# Patient Record
Sex: MALE | Race: OTHER | NOT HISPANIC OR LATINO
[De-identification: names, ages, dates, MRNs, and addresses within clinical notes are randomized per-mention and may not be internally consistent; named-entity substitution may affect disease eponyms.]

---

## 2021-09-30 RX ORDER — CHLORHEXIDINE GLUCONATE 213 G/1000ML
1 SOLUTION TOPICAL ONCE
Refills: 0 | Status: DISCONTINUED | OUTPATIENT
Start: 2021-10-04 | End: 2021-10-05

## 2021-09-30 RX ORDER — DAPAGLIFLOZIN 10 MG/1
1 TABLET, FILM COATED ORAL
Qty: 0 | Refills: 0 | DISCHARGE

## 2021-09-30 NOTE — H&P ADULT - HISTORY OF PRESENT ILLNESS
***SKELETON***    CARDIOLOGIST: Dr. Tejeda  COVID:  PHARMACY:  ESCORT:     GINA tis 65yo Male, active smoker (1PPD) w/ PMHx of HTN, HLD, DM T2, CAD (known mLAD occlusion w/ Hx of failed PCI attempt) who presented to cardiologist, Dr. Tejeda, for evaluation of chest pain that has been occurring for several years and described as intermittent, moderate, substernal, and "pressure-like". Episodes occur mostly with exertion and relieved w/ rest in a few minutes. CP non-radiating and is associated w/ SOB. Pt denies diaphoresis, palpitations, dizziness/syncope, LE edema, orthopnea, PND.     TTE (8/5/21): LVEF 55-60%, grade II D D (pseudonormal), no significant valvular disease. EST (9/10/21): severe, large sized reversible defect in true apex; as well as anteroseptal and anterior walls from apex to mid ventricle, consistent with inducible ischemia in LAD territory.     In light of patient's risk factors, CCS Class III Anginal symptoms, and abnormal EST, patient now presents to [facility] for cardiac catheterization with possible intervention if clinically indicated.  CARDIOLOGIST: Dr. Tejeda  COVID: pt given hotline on 9/30/21  PHARMACY: [facility] (in [place])  ESCORT:     P tis 65yo Cantonese-speaking Male, active smoker (1PPD) w/ PMHx of HTN, HLD, DM T2, CAD (known mLAD occlusion w/ Hx of failed PCI attempt) who presented to cardiologist, Dr. Tejeda, for evaluation of chest pain that has been occurring for several years and described as intermittent, moderate, substernal, and "pressure-like". Episodes occur mostly with exertion and relieved w/ rest in a few minutes. CP non-radiating and is associated w/ SOB. Pt denies diaphoresis, palpitations, dizziness/syncope, LE edema, orthopnea, PND.     TTE (8/5/21): LVEF 55-60%, grade II D D (pseudonormal), no significant valvular disease. EST (9/10/21): severe, large sized reversible defect in true apex; as well as anteroseptal and anterior walls from apex to mid ventricle, consistent with inducible ischemia in LAD territory.     In light of patient's risk factors, CCS Class III Anginal symptoms, and abnormal EST, patient now presents to [facility] for cardiac catheterization with possible intervention if clinically indicated.  CARDIOLOGIST: Dr. Tejeda  COVID: pt given hotline on 9/30/21  PHARMACY: [facility] (in [place])  ESCORT:     Pt is 65yo Cantonese-speaking Male, active smoker (1PPD) w/ PMHx of HTN, HLD, DM T2, CAD (known mLAD occlusion w/ Hx of failed PCI attempt) who presented to cardiologist, Dr. Tejeda, for evaluation of chest pain that has been occurring for several years and described as intermittent, moderate, substernal, and "pressure-like". Episodes occur mostly with exertion and relieved w/ rest in a few minutes. CP non-radiating and is associated w/ SOB. Pt denies diaphoresis, palpitations, dizziness/syncope, LE edema, orthopnea, PND.     TTE (8/5/21): LVEF 55-60%, grade II D D (pseudonormal), no significant valvular disease. EST (9/10/21): severe, large sized reversible defect in true apex; as well as anteroseptal and anterior walls from apex to mid ventricle, consistent with inducible ischemia in LAD territory.     In light of patient's risk factors, CCS Class III Anginal symptoms, and abnormal EST, patient now presents to [facility] for cardiac catheterization with possible intervention if clinically indicated.

## 2021-09-30 NOTE — H&P ADULT - NSICDXPASTMEDICALHX_GEN_ALL_CORE_FT
PAST MEDICAL HISTORY:  CAD (coronary artery disease)     DM (diabetes mellitus)     HLD (hyperlipidemia)     HTN (hypertension)

## 2021-09-30 NOTE — H&P ADULT - ASSESSMENT
Risks & benefits of procedure and alternative therapy have been explained to the patient including but not limited to: allergic reaction, bleeding w/possible need for blood transfusion, infection, renal and vascular compromise, limb damage, arrhythmia, stroke, vessel dissection/perforation, Myocardial infarction, emergent CABG. Informed consent obtained and in chart.  Pt is 67yo Cantonese-speaking Male, active smoker (1PPD) w/ PMHx of HTN, HLD, DM T2, CAD (known mLAD occlusion w/ Hx of failed PCI attempt) who presented to cardiologist, Dr. Tejeda, for evaluation of chest pain that has been occurring for several years and described as intermittent, moderate, substernal, and "pressure-like". Episodes occur mostly with exertion and relieved w/ rest in a few minutes. CP non-radiating and is associated w/ SOB. Pt denies diaphoresis, palpitations, dizziness/syncope, LE edema, orthopnea, PND. TTE (8/5/21): LVEF 55-60%, grade II D D (pseudonormal), no significant valvular disease. EST (9/10/21): severe, large sized reversible defect in true apex; as well as anteroseptal and anterior walls from apex to mid ventricle, consistent with inducible ischemia in LAD territory. In light of patient's risk factors, CCS Class III Anginal symptoms, and abnormal EST, patient now presents to [facility] for cardiac catheterization with possible intervention if clinically indicated.     --ASA III; Mallampati III.   --VSS.   --Pt is a candidate for moderate sedation.   --LOAD: ASA 81mg PO x1 and Plavix 600mg PO x1.  --FLUIDS: NS 75cc/hr x4hrs.   --SEAFOOD ALLERGY: pt given Solucortef 200mg IV x1 and Benadryl 50mg IV x1 order to be given in procedure room.       Risks & benefits of procedure and alternative therapy have been explained to the patient including but not limited to: allergic reaction, bleeding w/possible need for blood transfusion, infection, renal and vascular compromise, limb damage, arrhythmia, stroke, vessel dissection/perforation, Myocardial infarction, emergent CABG. Informed consent obtained and in chart.

## 2021-10-01 ENCOUNTER — APPOINTMENT (OUTPATIENT)
Dept: DISASTER EMERGENCY | Facility: CLINIC | Age: 66
End: 2021-10-01

## 2021-10-01 DIAGNOSIS — Z01.818 ENCOUNTER FOR OTHER PREPROCEDURAL EXAMINATION: ICD-10-CM

## 2021-10-01 PROBLEM — Z00.00 ENCOUNTER FOR PREVENTIVE HEALTH EXAMINATION: Status: ACTIVE | Noted: 2021-10-01

## 2021-10-04 ENCOUNTER — INPATIENT (INPATIENT)
Facility: HOSPITAL | Age: 66
LOS: 0 days | Discharge: HOME | DRG: 287 | End: 2021-10-05
Attending: INTERNAL MEDICINE | Admitting: INTERNAL MEDICINE
Payer: MEDICARE

## 2021-10-04 VITALS
HEIGHT: 64.17 IN | RESPIRATION RATE: 16 BRPM | OXYGEN SATURATION: 98 % | HEART RATE: 70 BPM | DIASTOLIC BLOOD PRESSURE: 80 MMHG | WEIGHT: 216.05 LBS | SYSTOLIC BLOOD PRESSURE: 167 MMHG

## 2021-10-04 LAB
A1C WITH ESTIMATED AVERAGE GLUCOSE RESULT: 6.2 % — HIGH (ref 4–5.6)
ALBUMIN SERPL ELPH-MCNC: 4.8 G/DL — SIGNIFICANT CHANGE UP (ref 3.3–5)
ALP SERPL-CCNC: 84 U/L — SIGNIFICANT CHANGE UP (ref 40–120)
ALT FLD-CCNC: 35 U/L — SIGNIFICANT CHANGE UP (ref 10–45)
ANION GAP SERPL CALC-SCNC: 11 MMOL/L — SIGNIFICANT CHANGE UP (ref 5–17)
APTT BLD: 36.1 SEC — HIGH (ref 27.5–35.5)
AST SERPL-CCNC: 24 U/L — SIGNIFICANT CHANGE UP (ref 10–40)
BASOPHILS # BLD AUTO: 0.07 K/UL — SIGNIFICANT CHANGE UP (ref 0–0.2)
BASOPHILS NFR BLD AUTO: 0.8 % — SIGNIFICANT CHANGE UP (ref 0–2)
BILIRUB SERPL-MCNC: 0.9 MG/DL — SIGNIFICANT CHANGE UP (ref 0.2–1.2)
BUN SERPL-MCNC: 18 MG/DL — SIGNIFICANT CHANGE UP (ref 7–23)
CALCIUM SERPL-MCNC: 10 MG/DL — SIGNIFICANT CHANGE UP (ref 8.4–10.5)
CHLORIDE SERPL-SCNC: 102 MMOL/L — SIGNIFICANT CHANGE UP (ref 96–108)
CHOLEST SERPL-MCNC: 113 MG/DL — SIGNIFICANT CHANGE UP
CK MB CFR SERPL CALC: 1.4 NG/ML — SIGNIFICANT CHANGE UP (ref 0–6.7)
CK SERPL-CCNC: 53 U/L — SIGNIFICANT CHANGE UP (ref 30–200)
CO2 SERPL-SCNC: 29 MMOL/L — SIGNIFICANT CHANGE UP (ref 22–31)
CREAT SERPL-MCNC: 0.59 MG/DL — SIGNIFICANT CHANGE UP (ref 0.5–1.3)
EOSINOPHIL # BLD AUTO: 0.21 K/UL — SIGNIFICANT CHANGE UP (ref 0–0.5)
EOSINOPHIL NFR BLD AUTO: 2.5 % — SIGNIFICANT CHANGE UP (ref 0–6)
ESTIMATED AVERAGE GLUCOSE: 131 MG/DL — HIGH (ref 68–114)
GLUCOSE BLDC GLUCOMTR-MCNC: 114 MG/DL — HIGH (ref 70–99)
GLUCOSE BLDC GLUCOMTR-MCNC: 140 MG/DL — HIGH (ref 70–99)
GLUCOSE BLDC GLUCOMTR-MCNC: 191 MG/DL — HIGH (ref 70–99)
GLUCOSE SERPL-MCNC: 122 MG/DL — HIGH (ref 70–99)
HCT VFR BLD CALC: 46.1 % — SIGNIFICANT CHANGE UP (ref 39–50)
HDLC SERPL-MCNC: 37 MG/DL — LOW
HGB BLD-MCNC: 15.5 G/DL — SIGNIFICANT CHANGE UP (ref 13–17)
IMM GRANULOCYTES NFR BLD AUTO: 0.6 % — SIGNIFICANT CHANGE UP (ref 0–1.5)
INR BLD: 1 — SIGNIFICANT CHANGE UP (ref 0.88–1.16)
LIPID PNL WITH DIRECT LDL SERPL: 54 MG/DL — SIGNIFICANT CHANGE UP
LYMPHOCYTES # BLD AUTO: 3.08 K/UL — SIGNIFICANT CHANGE UP (ref 1–3.3)
LYMPHOCYTES # BLD AUTO: 35.9 % — SIGNIFICANT CHANGE UP (ref 13–44)
MCHC RBC-ENTMCNC: 30.8 PG — SIGNIFICANT CHANGE UP (ref 27–34)
MCHC RBC-ENTMCNC: 33.6 GM/DL — SIGNIFICANT CHANGE UP (ref 32–36)
MCV RBC AUTO: 91.5 FL — SIGNIFICANT CHANGE UP (ref 80–100)
MONOCYTES # BLD AUTO: 0.71 K/UL — SIGNIFICANT CHANGE UP (ref 0–0.9)
MONOCYTES NFR BLD AUTO: 8.3 % — SIGNIFICANT CHANGE UP (ref 2–14)
NEUTROPHILS # BLD AUTO: 4.45 K/UL — SIGNIFICANT CHANGE UP (ref 1.8–7.4)
NEUTROPHILS NFR BLD AUTO: 51.9 % — SIGNIFICANT CHANGE UP (ref 43–77)
NON HDL CHOLESTEROL: 76 MG/DL — SIGNIFICANT CHANGE UP
NRBC # BLD: 0 /100 WBCS — SIGNIFICANT CHANGE UP (ref 0–0)
PLATELET # BLD AUTO: 144 K/UL — LOW (ref 150–400)
POTASSIUM SERPL-MCNC: 4 MMOL/L — SIGNIFICANT CHANGE UP (ref 3.5–5.3)
POTASSIUM SERPL-SCNC: 4 MMOL/L — SIGNIFICANT CHANGE UP (ref 3.5–5.3)
PROT SERPL-MCNC: 8.8 G/DL — HIGH (ref 6–8.3)
PROTHROM AB SERPL-ACNC: 12 SEC — SIGNIFICANT CHANGE UP (ref 10.6–13.6)
RBC # BLD: 5.04 M/UL — SIGNIFICANT CHANGE UP (ref 4.2–5.8)
RBC # FLD: 13.9 % — SIGNIFICANT CHANGE UP (ref 10.3–14.5)
SODIUM SERPL-SCNC: 142 MMOL/L — SIGNIFICANT CHANGE UP (ref 135–145)
TRIGL SERPL-MCNC: 112 MG/DL — SIGNIFICANT CHANGE UP
WBC # BLD: 8.57 K/UL — SIGNIFICANT CHANGE UP (ref 3.8–10.5)
WBC # FLD AUTO: 8.57 K/UL — SIGNIFICANT CHANGE UP (ref 3.8–10.5)

## 2021-10-04 PROCEDURE — 99152 MOD SED SAME PHYS/QHP 5/>YRS: CPT | Mod: 59

## 2021-10-04 PROCEDURE — 93010 ELECTROCARDIOGRAM REPORT: CPT

## 2021-10-04 PROCEDURE — 93454 CORONARY ARTERY ANGIO S&I: CPT | Mod: 26

## 2021-10-04 RX ORDER — ATORVASTATIN CALCIUM 80 MG/1
1 TABLET, FILM COATED ORAL
Qty: 0 | Refills: 0 | DISCHARGE

## 2021-10-04 RX ORDER — INSULIN LISPRO 100/ML
VIAL (ML) SUBCUTANEOUS
Refills: 0 | Status: DISCONTINUED | OUTPATIENT
Start: 2021-10-04 | End: 2021-10-05

## 2021-10-04 RX ORDER — NITROGLYCERIN 6.5 MG
1 CAPSULE, EXTENDED RELEASE ORAL
Qty: 0 | Refills: 0 | DISCHARGE

## 2021-10-04 RX ORDER — DEXTROSE 50 % IN WATER 50 %
25 SYRINGE (ML) INTRAVENOUS ONCE
Refills: 0 | Status: DISCONTINUED | OUTPATIENT
Start: 2021-10-04 | End: 2021-10-05

## 2021-10-04 RX ORDER — METOPROLOL TARTRATE 50 MG
50 TABLET ORAL DAILY
Refills: 0 | Status: DISCONTINUED | OUTPATIENT
Start: 2021-10-04 | End: 2021-10-05

## 2021-10-04 RX ORDER — HYDROCORTISONE 20 MG
200 TABLET ORAL ONCE
Refills: 0 | Status: COMPLETED | OUTPATIENT
Start: 2021-10-04 | End: 2021-10-04

## 2021-10-04 RX ORDER — CLOPIDOGREL BISULFATE 75 MG/1
600 TABLET, FILM COATED ORAL ONCE
Refills: 0 | Status: COMPLETED | OUTPATIENT
Start: 2021-10-04 | End: 2021-10-04

## 2021-10-04 RX ORDER — ATORVASTATIN CALCIUM 80 MG/1
40 TABLET, FILM COATED ORAL AT BEDTIME
Refills: 0 | Status: DISCONTINUED | OUTPATIENT
Start: 2021-10-04 | End: 2021-10-05

## 2021-10-04 RX ORDER — SODIUM CHLORIDE 9 MG/ML
500 INJECTION INTRAMUSCULAR; INTRAVENOUS; SUBCUTANEOUS
Refills: 0 | Status: DISCONTINUED | OUTPATIENT
Start: 2021-10-04 | End: 2021-10-05

## 2021-10-04 RX ORDER — DEXTROSE 50 % IN WATER 50 %
15 SYRINGE (ML) INTRAVENOUS ONCE
Refills: 0 | Status: DISCONTINUED | OUTPATIENT
Start: 2021-10-04 | End: 2021-10-05

## 2021-10-04 RX ORDER — METFORMIN HYDROCHLORIDE 850 MG/1
1 TABLET ORAL
Qty: 0 | Refills: 0 | DISCHARGE

## 2021-10-04 RX ORDER — ASPIRIN/CALCIUM CARB/MAGNESIUM 324 MG
81 TABLET ORAL ONCE
Refills: 0 | Status: COMPLETED | OUTPATIENT
Start: 2021-10-04 | End: 2021-10-04

## 2021-10-04 RX ORDER — SODIUM CHLORIDE 9 MG/ML
1000 INJECTION, SOLUTION INTRAVENOUS
Refills: 0 | Status: DISCONTINUED | OUTPATIENT
Start: 2021-10-04 | End: 2021-10-05

## 2021-10-04 RX ORDER — GLUCAGON INJECTION, SOLUTION 0.5 MG/.1ML
1 INJECTION, SOLUTION SUBCUTANEOUS ONCE
Refills: 0 | Status: DISCONTINUED | OUTPATIENT
Start: 2021-10-04 | End: 2021-10-05

## 2021-10-04 RX ORDER — SODIUM CHLORIDE 9 MG/ML
500 INJECTION INTRAMUSCULAR; INTRAVENOUS; SUBCUTANEOUS
Refills: 0 | Status: DISCONTINUED | OUTPATIENT
Start: 2021-10-04 | End: 2021-10-04

## 2021-10-04 RX ORDER — AMLODIPINE BESYLATE 2.5 MG/1
1 TABLET ORAL
Qty: 0 | Refills: 0 | DISCHARGE

## 2021-10-04 RX ORDER — ASPIRIN/CALCIUM CARB/MAGNESIUM 324 MG
81 TABLET ORAL DAILY
Refills: 0 | Status: DISCONTINUED | OUTPATIENT
Start: 2021-10-05 | End: 2021-10-05

## 2021-10-04 RX ORDER — ISOSORBIDE MONONITRATE 60 MG/1
30 TABLET, EXTENDED RELEASE ORAL DAILY
Refills: 0 | Status: DISCONTINUED | OUTPATIENT
Start: 2021-10-04 | End: 2021-10-05

## 2021-10-04 RX ORDER — DEXTROSE 50 % IN WATER 50 %
12.5 SYRINGE (ML) INTRAVENOUS ONCE
Refills: 0 | Status: DISCONTINUED | OUTPATIENT
Start: 2021-10-04 | End: 2021-10-05

## 2021-10-04 RX ORDER — ERGOCALCIFEROL 1.25 MG/1
1 CAPSULE ORAL
Qty: 0 | Refills: 0 | DISCHARGE

## 2021-10-04 RX ORDER — DAPAGLIFLOZIN 10 MG/1
1 TABLET, FILM COATED ORAL
Qty: 0 | Refills: 0 | DISCHARGE

## 2021-10-04 RX ORDER — DIPHENHYDRAMINE HCL 50 MG
50 CAPSULE ORAL ONCE
Refills: 0 | Status: DISCONTINUED | OUTPATIENT
Start: 2021-10-04 | End: 2021-10-05

## 2021-10-04 RX ADMIN — Medication 2: at 21:52

## 2021-10-04 RX ADMIN — CLOPIDOGREL BISULFATE 600 MILLIGRAM(S): 75 TABLET, FILM COATED ORAL at 12:43

## 2021-10-04 RX ADMIN — SODIUM CHLORIDE 75 MILLILITER(S): 9 INJECTION INTRAMUSCULAR; INTRAVENOUS; SUBCUTANEOUS at 12:44

## 2021-10-04 RX ADMIN — ATORVASTATIN CALCIUM 40 MILLIGRAM(S): 80 TABLET, FILM COATED ORAL at 21:22

## 2021-10-04 RX ADMIN — Medication 200 MILLIGRAM(S): at 12:45

## 2021-10-04 RX ADMIN — Medication 81 MILLIGRAM(S): at 12:43

## 2021-10-04 NOTE — PATIENT PROFILE ADULT - NSTOBACCO QUIT READY_GEN_A_CORE_SD
patient is in process of quitting, slowly by decreasing the amount of cigarettes per day./ready to quit

## 2021-10-04 NOTE — PROGRESS NOTE ADULT - SUBJECTIVE AND OBJECTIVE BOX
Interventional Cardiology PA SDA Discharge Note    Patient without complaints.     Afebrile, VSS    Ext:    		Right/Left             Groin:   no    hematoma,    no bruit, dressing; C/D/I  		        Right   Radial :  no  hematoma,  no   bleeding, dressing; C/D/I      Pulses:    intact RAD/DP/PT to baseline     A/P:        67yo Cantonese-speaking Male, active smoker (1PPD) w/ PMHx of HTN, HLD, DM T2, CAD (known mLAD occlusion w/ Hx of failed PCI attempt) who presented to cardiologist, Dr. Tejeda, for evaluation of chest pain that has been occurring for several years and described as intermittent, moderate, substernal, and "pressure-like". Episodes occur mostly with exertion and relieved w/ rest in a few minutes. CP non-radiating and is associated w/ SOB. Pt denies diaphoresis, palpitations, dizziness/syncope, LE edema, orthopnea, PND.  TTE (8/5/21): LVEF 55-60%, grade II D D (pseudonormal), no significant valvular disease. EST (9/10/21): severe, large sized reversible defect in true apex; as well as anteroseptal and anterior walls from apex to mid ventricle, consistent with inducible ischemia in LAD territory.     In light of patient's risk factors, CCS Class III Anginal symptoms, and abnormal EST, patient now presents to [facility] for cardiac catheterization with possible intervention if clinically indicated.     Pt. s/p cardiac cath 10/4/21: unsuccessful  attempt of 100 % total occlusion of pRCA with plan to reattempt in 6 week; R rad TR band, R groin PC and L groin AS.          1.	Stable for discharge as per attending  __Nikhil_______ after bed rest, pt voids, groin/wrist stable and 30 minutes of ambulation.  2.	Follow-up with PMD/Cardiologist _Dr. Tejeda_________ in 1-2 weeks  3.	Discharged forms signed and copies in chart    Interventional Cardiology PA SDA Discharge Note    Patient without complaints.     Afebrile, VSS    Ext:    		Right/Left             Groin:   no    hematoma,    no bruit, dressing; C/D/I  		        Right   Radial :  no  hematoma,  no   bleeding, dressing; C/D/I      Pulses:    intact RAD/DP/PT to baseline     A/P:        65yo Cantonese-speaking Male, active smoker (1PPD) w/ PMHx of HTN, HLD, DM T2, CAD (known mLAD occlusion w/ Hx of failed PCI attempt) who presented to cardiologist, Dr. Tejeda, for evaluation of chest pain that has been occurring for several years and described as intermittent, moderate, substernal, and "pressure-like". Episodes occur mostly with exertion and relieved w/ rest in a few minutes. CP non-radiating and is associated w/ SOB. Pt denies diaphoresis, palpitations, dizziness/syncope, LE edema, orthopnea, PND.  TTE (8/5/21): LVEF 55-60%, grade II D D (pseudonormal), no significant valvular disease. EST (9/10/21): severe, large sized reversible defect in true apex; as well as anteroseptal and anterior walls from apex to mid ventricle, consistent with inducible ischemia in LAD territory.     In light of patient's risk factors, CCS Class III Anginal symptoms, and abnormal EST, patient now presents to [facility] for cardiac catheterization with possible intervention if clinically indicated.     Pt. s/p cardiac cath 10/4/21: unsuccessful  attempt of pLAD with plan to reattempt in 6 week; R rad TR band, R groin PC and L groin AS.          1.	Stable for discharge as per attending  __Nikhil_______ after bed rest, pt voids, groin/wrist stable and 30 minutes of ambulation.  2.	Follow-up with PMD/Cardiologist _Dr. Tejeda_________ in 1-2 weeks  3.	Discharged forms signed and copies in chart

## 2021-10-05 ENCOUNTER — TRANSCRIPTION ENCOUNTER (OUTPATIENT)
Age: 66
End: 2021-10-05

## 2021-10-05 VITALS
HEART RATE: 60 BPM | RESPIRATION RATE: 18 BRPM | DIASTOLIC BLOOD PRESSURE: 72 MMHG | SYSTOLIC BLOOD PRESSURE: 140 MMHG | OXYGEN SATURATION: 98 %

## 2021-10-05 LAB
ANION GAP SERPL CALC-SCNC: 10 MMOL/L — SIGNIFICANT CHANGE UP (ref 5–17)
BUN SERPL-MCNC: 23 MG/DL — SIGNIFICANT CHANGE UP (ref 7–23)
CALCIUM SERPL-MCNC: 9 MG/DL — SIGNIFICANT CHANGE UP (ref 8.4–10.5)
CHLORIDE SERPL-SCNC: 109 MMOL/L — HIGH (ref 96–108)
CO2 SERPL-SCNC: 25 MMOL/L — SIGNIFICANT CHANGE UP (ref 22–31)
COVID-19 SPIKE DOMAIN AB INTERP: POSITIVE
COVID-19 SPIKE DOMAIN ANTIBODY RESULT: >250 U/ML — HIGH
CREAT SERPL-MCNC: 0.61 MG/DL — SIGNIFICANT CHANGE UP (ref 0.5–1.3)
GLUCOSE BLDC GLUCOMTR-MCNC: 143 MG/DL — HIGH (ref 70–99)
GLUCOSE BLDC GLUCOMTR-MCNC: 163 MG/DL — HIGH (ref 70–99)
GLUCOSE SERPL-MCNC: 134 MG/DL — HIGH (ref 70–99)
HCT VFR BLD CALC: 41.3 % — SIGNIFICANT CHANGE UP (ref 39–50)
HCV AB S/CO SERPL IA: 0.15 S/CO — SIGNIFICANT CHANGE UP
HCV AB SERPL-IMP: SIGNIFICANT CHANGE UP
HGB BLD-MCNC: 13.7 G/DL — SIGNIFICANT CHANGE UP (ref 13–17)
MAGNESIUM SERPL-MCNC: 1.8 MG/DL — SIGNIFICANT CHANGE UP (ref 1.6–2.6)
MCHC RBC-ENTMCNC: 31.4 PG — SIGNIFICANT CHANGE UP (ref 27–34)
MCHC RBC-ENTMCNC: 33.2 GM/DL — SIGNIFICANT CHANGE UP (ref 32–36)
MCV RBC AUTO: 94.5 FL — SIGNIFICANT CHANGE UP (ref 80–100)
NRBC # BLD: 0 /100 WBCS — SIGNIFICANT CHANGE UP (ref 0–0)
PLATELET # BLD AUTO: 133 K/UL — LOW (ref 150–400)
POTASSIUM SERPL-MCNC: 3.8 MMOL/L — SIGNIFICANT CHANGE UP (ref 3.5–5.3)
POTASSIUM SERPL-SCNC: 3.8 MMOL/L — SIGNIFICANT CHANGE UP (ref 3.5–5.3)
RBC # BLD: 4.37 M/UL — SIGNIFICANT CHANGE UP (ref 4.2–5.8)
RBC # FLD: 14 % — SIGNIFICANT CHANGE UP (ref 10.3–14.5)
SARS-COV-2 IGG+IGM SERPL QL IA: >250 U/ML — HIGH
SARS-COV-2 IGG+IGM SERPL QL IA: POSITIVE
SODIUM SERPL-SCNC: 144 MMOL/L — SIGNIFICANT CHANGE UP (ref 135–145)
WBC # BLD: 7.85 K/UL — SIGNIFICANT CHANGE UP (ref 3.8–10.5)
WBC # FLD AUTO: 7.85 K/UL — SIGNIFICANT CHANGE UP (ref 3.8–10.5)

## 2021-10-05 RX ORDER — METFORMIN HYDROCHLORIDE 850 MG/1
1 TABLET ORAL
Qty: 0 | Refills: 0 | DISCHARGE

## 2021-10-05 RX ORDER — MAGNESIUM OXIDE 400 MG ORAL TABLET 241.3 MG
400 TABLET ORAL ONCE
Refills: 0 | Status: COMPLETED | OUTPATIENT
Start: 2021-10-05 | End: 2021-10-05

## 2021-10-05 RX ORDER — POTASSIUM CHLORIDE 20 MEQ
20 PACKET (EA) ORAL ONCE
Refills: 0 | Status: COMPLETED | OUTPATIENT
Start: 2021-10-05 | End: 2021-10-05

## 2021-10-05 RX ADMIN — Medication 2: at 12:03

## 2021-10-05 RX ADMIN — Medication 81 MILLIGRAM(S): at 12:02

## 2021-10-05 RX ADMIN — MAGNESIUM OXIDE 400 MG ORAL TABLET 400 MILLIGRAM(S): 241.3 TABLET ORAL at 07:38

## 2021-10-05 RX ADMIN — Medication 50 MILLIGRAM(S): at 05:27

## 2021-10-05 RX ADMIN — ISOSORBIDE MONONITRATE 30 MILLIGRAM(S): 60 TABLET, EXTENDED RELEASE ORAL at 12:02

## 2021-10-05 RX ADMIN — Medication 20 MILLIEQUIVALENT(S): at 07:38

## 2021-10-05 NOTE — DISCHARGE NOTE PROVIDER - HOSPITAL COURSE
65yo Cantonese-speaking Male, active smoker (1PPD) w/ PMHx of HTN, HLD, DM T2, CAD (known mLAD occlusion w/ Hx of failed PCI attempt) who presented to cardiologist, Dr. Tejeda, for evaluation of chest pain that has been occurring for several years and described as intermittent, moderate, substernal, and "pressure-like". Episodes occur mostly with exertion and relieved w/ rest in a few minutes. CP non-radiating and is associated w/ SOB. Pt denies diaphoresis, palpitations, dizziness/syncope, LE edema, orthopnea, PND.     TTE (8/5/21): LVEF 55-60%, grade II D D (pseudonormal), no significant valvular disease. EST (9/10/21): severe, large sized reversible defect in true apex; as well as anteroseptal and anterior walls from apex to mid ventricle, consistent with inducible ischemia in LAD territory.   In light of patient's risk factors, CCS Class III Anginal symptoms, and abnormal EST, patient now presents to [facility] for cardiac catheterization with possible intervention if clinically indicated.     Patient now s/p cardiac cath 10/4: unsuccessful  attempt of pLAD with plan to reattempt in 6 weeks; R rad TR band X 2, R groin PC and L groin AS. EF normal by ECHO. Post procedure R radial hematoma compressed; additional radial band placed    Pt seen and examined at bedside this morning. Pt comfortable, denies any CP, SOB, dizziness, or palpitations. VSS, right radial and BL groin access sites stable, no bleeding or hematoma noted, pulse 2 + b/l. AM labs stable. Patient has been given appropriate discharge instructions including medication regimen, access site management and follow up. Prescriptions have been e-prescribed to patient's preferred pharmacy. Follow up with Dr. Tejeda 10/7 and return in 6 weeks for re-attempt.     DC Meds: 65yo Cantonese-speaking Male, active smoker (1PPD) w/ PMHx of HTN, HLD, DM T2, CAD (known mLAD occlusion w/ Hx of failed PCI attempt) who presented to cardiologist, Dr. Tejeda, for evaluation of chest pain that has been occurring for several years and described as intermittent, moderate, substernal, and "pressure-like". Episodes occur mostly with exertion and relieved w/ rest in a few minutes. CP non-radiating and is associated w/ SOB. Pt denies diaphoresis, palpitations, dizziness/syncope, LE edema, orthopnea, PND.     TTE (8/5/21): LVEF 55-60%, grade II D D (pseudonormal), no significant valvular disease. EST (9/10/21): severe, large sized reversible defect in true apex; as well as anteroseptal and anterior walls from apex to mid ventricle, consistent with inducible ischemia in LAD territory.   In light of patient's risk factors, CCS Class III Anginal symptoms, and abnormal EST, patient now presents to [facility] for cardiac catheterization with possible intervention if clinically indicated.     Patient now s/p cardiac cath 10/4: unsuccessful  attempt of pLAD with plan to reattempt in 6 weeks; R rad TR band X 2, R groin PC and L groin AS. EF normal by ECHO. Post procedure R radial hematoma compressed; additional radial band placed    Pt seen and examined at bedside this morning. Pt comfortable, denies any CP, SOB, dizziness, or palpitations. VSS, right radial and BL groin access sites stable, no bleeding or hematoma noted, pulse 2 + b/l. AM labs stable. Patient has been given appropriate discharge instructions including medication regimen, access site management and follow up. Prescriptions have been e-prescribed to patient's preferred pharmacy. Follow up with Dr. Tejeda 10/7 and return in 6 weeks for re-attempt.     DC Meds:   ASA 81mg  Imdur 30mg  Losartan 100mg  Rosuvastatin 10mg  Toprol XL 50mg  Vascepa 2g BID

## 2021-10-05 NOTE — DISCHARGE NOTE PROVIDER - NSDCCPCAREPLAN_GEN_ALL_CORE_FT
PRINCIPAL DISCHARGE DIAGNOSIS  Diagnosis: CAD (coronary artery disease)  Assessment and Plan of Treatment: - You came to the hospital for a planned cardiac cath as you had an abnormal stress test. You underwent a cardiac cath which was unsuccessful while attempting to intervene on an occluded artery. You will RETURN IN 6 WEEKS to reattempt the intervention.  - Do NOT drive or operate hazardous machinery for 24 hours. Limit your physical activity for 24-48 hours. Do NOT engage in sports, heavy work or heavy lifting for 72 hours.   - You MAY shower BUT no TUB BATHS, HOT TUBS OR SWIMMING FOR 5 DAYS  - Your procedure was done through BOTH groins and your right wrist. If you observe flank bleeding from the puncture site, it is an emergency. Please put direct pressure on the site and go directly to the ER. Bleeding under the skin may also occur and a small "black and blue" may be expected. If the area appears to be expanding or swelling around the puncture site, apply manual compression and go immediately to the nearest ER. If your foot/leg or arm/hand becomes cool or blue and/or you are unable to move it, this must be treated as an emergency, go directly to the nearest ER. Look for signs of infection in the groin or wrist: fever, red streaking of the leg, obvious pus formation and pain.       PRINCIPAL DISCHARGE DIAGNOSIS  Diagnosis: CAD (coronary artery disease)  Assessment and Plan of Treatment: - You came to the hospital for a planned cardiac cath as you had an abnormal stress test. You underwent a cardiac cath which was unsuccessful while attempting to intervene on an occluded artery. You will RETURN IN 6 WEEKS to reattempt the intervention.  - Do NOT drive or operate hazardous machinery for 24 hours. Limit your physical activity for 24-48 hours. Do NOT engage in sports, heavy work or heavy lifting for 72 hours.   - You MAY shower BUT no TUB BATHS, HOT TUBS OR SWIMMING FOR 5 DAYS  - Your procedure was done through BOTH groins and your right wrist. If you observe flank bleeding from the puncture site, it is an emergency. Please put direct pressure on the site and go directly to the ER. Bleeding under the skin may also occur and a small "black and blue" may be expected. If the area appears to be expanding or swelling around the puncture site, apply manual compression and go immediately to the nearest ER. If your foot/leg or arm/hand becomes cool or blue and/or you are unable to move it, this must be treated as an emergency, go directly to the nearest ER. Look for signs of infection in the groin or wrist: fever, red streaking of the leg, obvious pus formation and pain.      SECONDARY DISCHARGE DIAGNOSES  Diagnosis: Diabetes  Assessment and Plan of Treatment: You have a known history of diabetes for which you take Metformin. As you received contrast intravenously (through your IV) it can affect your kidney function and an accumulation of metformin can happen leading to HYPOGLYCEMIA. For this reason you are to HOLD metformin for 48 hours and you may RESUME IT ON 10/7/21.

## 2021-10-05 NOTE — DISCHARGE NOTE PROVIDER - CARE PROVIDER_API CALL
Katelyn Tejeda)  Cardiovascular Medicine  [facility], [facility], [address], [address]  [place] [address]  Phone: [phone]  Fax: [phone]  Established Patient  Scheduled Appointment: 10/07/2021

## 2021-10-05 NOTE — DISCHARGE NOTE PROVIDER - NSDCMRMEDTOKEN_GEN_ALL_CORE_FT
Aspirin Enteric Coated 81 mg oral delayed release tablet: 1 tab(s) orally once a day  isosorbide mononitrate 30 mg oral tablet, extended release: 1 tab(s) orally once a day (in the morning)  Jardiance 25 mg oral tablet: 1 tab(s) orally once a day (in the morning)  losartan 100 mg oral tablet: 1 tab(s) orally once a day  metFORMIN 850 mg oral tablet: 1 tab(s) orally 2 times a day  rosuvastatin 10 mg oral tablet: 1 tab(s) orally once a day  Toprol-XL 50 mg oral tablet, extended release: 1 tab(s) orally once a day  Vascepa 1 g oral capsule: 2 cap(s) orally 2 times a day   Aspirin Enteric Coated 81 mg oral delayed release tablet: 1 tab(s) orally once a day  isosorbide mononitrate 30 mg oral tablet, extended release: 1 tab(s) orally once a day (in the morning)  Jardiance 25 mg oral tablet: 1 tab(s) orally once a day (in the morning)  losartan 100 mg oral tablet: 1 tab(s) orally once a day  metFORMIN 850 mg oral tablet: 1 tab(s) orally 2 times a day. RESUME 10/7  rosuvastatin 10 mg oral tablet: 1 tab(s) orally once a day  Toprol-XL 50 mg oral tablet, extended release: 1 tab(s) orally once a day  Vascepa 1 g oral capsule: 2 cap(s) orally 2 times a day

## 2021-10-05 NOTE — DISCHARGE NOTE NURSING/CASE MANAGEMENT/SOCIAL WORK - PATIENT PORTAL LINK FT
You can access the FollowMyHealth Patient Portal offered by [facility] by registering at the following website: [email or web address]. By joining [facility]’s FollowMyHealth portal, you will also be able to view your health information using other applications (apps) compatible with our system.
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
## 2021-10-08 DIAGNOSIS — F17.210 NICOTINE DEPENDENCE, CIGARETTES, UNCOMPLICATED: ICD-10-CM

## 2021-10-08 DIAGNOSIS — L76.32 POSTPROCEDURAL HEMATOMA OF SKIN AND SUBCUTANEOUS TISSUE FOLLOWING OTHER PROCEDURE: ICD-10-CM

## 2021-10-08 DIAGNOSIS — I25.118 ATHEROSCLEROTIC HEART DISEASE OF NATIVE CORONARY ARTERY WITH OTHER FORMS OF ANGINA PECTORIS: ICD-10-CM

## 2021-10-08 DIAGNOSIS — I10 ESSENTIAL (PRIMARY) HYPERTENSION: ICD-10-CM

## 2021-10-08 DIAGNOSIS — I25.82 CHRONIC TOTAL OCCLUSION OF CORONARY ARTERY: ICD-10-CM

## 2021-10-08 DIAGNOSIS — I25.84 CORONARY ATHEROSCLEROSIS DUE TO CALCIFIED CORONARY LESION: ICD-10-CM

## 2021-10-26 PROCEDURE — 93005 ELECTROCARDIOGRAM TRACING: CPT

## 2021-10-26 PROCEDURE — 85610 PROTHROMBIN TIME: CPT

## 2021-10-26 PROCEDURE — C1887: CPT

## 2021-10-26 PROCEDURE — 80048 BASIC METABOLIC PNL TOTAL CA: CPT

## 2021-10-26 PROCEDURE — 83735 ASSAY OF MAGNESIUM: CPT

## 2021-10-26 PROCEDURE — C1725: CPT

## 2021-10-26 PROCEDURE — 85027 COMPLETE CBC AUTOMATED: CPT

## 2021-10-26 PROCEDURE — C1894: CPT

## 2021-10-26 PROCEDURE — 80061 LIPID PANEL: CPT

## 2021-10-26 PROCEDURE — C1769: CPT

## 2021-10-26 PROCEDURE — 82553 CREATINE MB FRACTION: CPT

## 2021-10-26 PROCEDURE — 36415 COLL VENOUS BLD VENIPUNCTURE: CPT

## 2021-10-26 PROCEDURE — 86803 HEPATITIS C AB TEST: CPT

## 2021-10-26 PROCEDURE — 82550 ASSAY OF CK (CPK): CPT

## 2021-10-26 PROCEDURE — 80053 COMPREHEN METABOLIC PANEL: CPT

## 2021-10-26 PROCEDURE — 83036 HEMOGLOBIN GLYCOSYLATED A1C: CPT

## 2021-10-26 PROCEDURE — 86769 SARS-COV-2 COVID-19 ANTIBODY: CPT

## 2021-10-26 PROCEDURE — 85730 THROMBOPLASTIN TIME PARTIAL: CPT

## 2021-10-26 PROCEDURE — C1760: CPT

## 2021-10-26 PROCEDURE — 85025 COMPLETE CBC W/AUTO DIFF WBC: CPT

## 2021-10-26 PROCEDURE — 82962 GLUCOSE BLOOD TEST: CPT

## 2021-11-13 PROBLEM — I25.10 ATHEROSCLEROTIC HEART DISEASE OF NATIVE CORONARY ARTERY WITHOUT ANGINA PECTORIS: Chronic | Status: ACTIVE | Noted: 2021-09-30

## 2021-11-13 PROBLEM — E78.5 HYPERLIPIDEMIA, UNSPECIFIED: Chronic | Status: ACTIVE | Noted: 2021-09-30

## 2021-11-13 PROBLEM — E11.9 TYPE 2 DIABETES MELLITUS WITHOUT COMPLICATIONS: Chronic | Status: ACTIVE | Noted: 2021-09-30

## 2021-11-13 PROBLEM — I10 ESSENTIAL (PRIMARY) HYPERTENSION: Chronic | Status: ACTIVE | Noted: 2021-09-30

## 2021-11-22 VITALS
OXYGEN SATURATION: 94 % | HEART RATE: 91 BPM | DIASTOLIC BLOOD PRESSURE: 76 MMHG | SYSTOLIC BLOOD PRESSURE: 141 MMHG | WEIGHT: 210.1 LBS | TEMPERATURE: 98 F | RESPIRATION RATE: 16 BRPM | HEIGHT: 64.17 IN

## 2021-11-22 RX ORDER — CHLORHEXIDINE GLUCONATE 213 G/1000ML
1 SOLUTION TOPICAL ONCE
Refills: 0 | Status: DISCONTINUED | OUTPATIENT
Start: 2021-11-29 | End: 2021-11-30

## 2021-11-22 NOTE — H&P ADULT - ASSESSMENT
66Y M, Cantonese speaking, active smoker, w/ SHELLFISH ALLERGY and PMHx of HTN, HLD, DM-II and known CAD s/p failed PCI of mLAD lesion, presents for staged PCI of mLAD.     -ASA 3, Mallampati 3  -Pt compliant w/ ASA 81mg, last dose yesterday. Will give home dose of ASA 81mg and load w/ Plavix 600mg prior to cath  -pre cath IVF Hydration NS @ 75cc/hr  -pt s/p Prednisone 50mg PO at 6pm, 12am and 6am prior to procedure for h/o shellfish allergy. Benadryl 50mg IVP ordered on call to cath lab table  -pre cath consented via language line  #[id number]    Risks & benefits of procedure and alternative therapy have been explained to the patient including but not limited to: allergic reaction, bleeding w/possible need for blood transfusion, infection, renal and vascular compromise, limb damage, arrhythmia, stroke, vessel dissection/perforation, Myocardial infarction, emergent CABG. Informed consent obtained and in chart.

## 2021-11-22 NOTE — H&P ADULT - NSHPLABSRESULTS_GEN_ALL_CORE
EKG: 16.0   10.30 )-----------( 177      ( 29 Nov 2021 08:22 )             46.3       139  |  100  |  19  ----------------------------<  265<H>  4.2   |  23  |  0.53    Ca    9.9      29 Nov 2021 08:23        PT/INR - ( 29 Nov 2021 08:22 )   PT: 11.8 sec;   INR: 0.98          PTT - ( 29 Nov 2021 08:22 )  PTT:34.6 sec

## 2021-11-22 NOTE — H&P ADULT - HISTORY OF PRESENT ILLNESS
COVID: Hotline # provided  Escort: Wife  Pharmacy: [facility] (in [place])  Cardiologist: Dr. Tejeda    Prednisone pre-medication sent to pharmacy and pt given pre-medication instructions. Benadryl ordered for on cath table.    *Confirm meds on arrival  67 y/o Cantonese speaking Male, active smoker (1PPD) w/ SHELLFISH ALLERGY and PMHx of HTN, HLD, T2DM, and known CAD s/p failed PCI of mLAD lesion, who presented to Cardiologist Dr. Tejeda c/o intermittent, non-radiating SSCP described as "pressure" w/ associated WONG that is exacerbated by exertion and alleviated by rest for the past several years. Pt denies diaphoresis, palpitations, dizziness/syncope, LE edema, orthopnea, PND. TTE 8/5/21: LVEF 55-60%, G1DD, (pseudonormal), no significant valvular disease. EST (9/10/21): severe, large sized reversible defect in true apex; as well as anteroseptal and anterior walls from apex to mid ventricle, consistent with inducible ischemia in LAD territory.  In light of patient's risk factors, CCS Class III Anginal symptoms, abnormal EST, and known CAD patient now presents to [facility] for cardiac catheterization with possible intervention if clinically indicated.     s/p cardiac cath 10/4/21: unsuccessful intervention to pLAD ; Plan: Reattempt in 6 weeks. Access:  R rad TR band X 2, R groin PC and L groin AS. Post procedure R radial hematoma compressed. COVID: PCR @ [facility] [address] [place] [address] 11/26/21 _______  Escort: Wife or brother  Pharmacy: [facility] (in [place])  Cardiologist: Dr. Tejeda    Prednisone pre-medication sent to pharmacy and pt given pre-medication instructions. Benadryl ordered for on cath table.  Hx obtained [id number]  *Confirm meds on arrival  67 y/o Cantonese speaking Male, active smoker (1PPD) w/ SHELLFISH ALLERGY (rash) and PMHx of HTN, HLD, T2DM, and known CAD s/p failed PCI of mLAD lesion, who presented to Cardiologist Dr. Tejeda c/o intermittent, non-radiating SSCP described as "pressure" w/ associated WONG that is exacerbated by exertion and alleviated by rest for the past several years. Pt denies diaphoresis, palpitations, dizziness/syncope, LE edema, orthopnea, PND. TTE 8/5/21: LVEF 55-60%, G1DD, (pseudonormal), no significant valvular disease. EST (9/10/21): severe, large sized reversible defect in true apex; as well as anteroseptal and anterior walls from apex to mid ventricle, consistent with inducible ischemia in LAD territory.  In light of patient's risk factors, CCS Class III Anginal symptoms, abnormal EST, and known CAD patient now presents to [facility] for cardiac catheterization with possible intervention if clinically indicated.     s/p cardiac cath 10/4/21: unsuccessful intervention to pLAD ; Plan: Reattempt in 6 weeks. Access:  R rad TR band X 2, R groin PC and L groin AS. Post procedure R radial hematoma compressed. COVID: negative 11/26/21 (in [place])  Pharmacy: [facility] (in [place])  Escort: Wife    *Hx obtained via language line  #[id number]    66Y M, Cantonese speaking, active smoker, w/ SHELLFISH ALLERGY and PMHx of HTN, HLD, DM-II and known CAD s/p failed PCI of mLAD lesion, presents for staged PCI of mLAD. Pt initially presented to Cardiologist Dr. Tejeda c/o intermittent, non-radiating SSCP described as "pressure" w/ associated SOB that is exacerbated by exertion and alleviated by rest for the past several years. Pt denies diaphoresis, palpitations, dizziness/syncope, LE edema, orthopnea, PND. TTE 8/5/21: LVEF 55-60%, G1DD, (pseudonormal), no significant valvular disease. EST (9/10/21): severe, large sized reversible defect in true apex; as well as anteroseptal and anterior walls from apex to mid ventricle, consistent with inducible ischemia in LAD territory. Pt subsequently underwent diagnostic cardiac cath 10/4/21: unsuccessful intervention to pLAD ; access R radial TR x 2, R groin PC and L groin AS.      In light of patient's risk factors, CCS Class III Anginal symptoms, and known CAD patient now presents to [facility] for staged PCI.

## 2021-11-29 ENCOUNTER — INPATIENT (INPATIENT)
Facility: HOSPITAL | Age: 66
LOS: 0 days | Discharge: HOME | DRG: 247 | End: 2021-11-30
Attending: INTERNAL MEDICINE | Admitting: INTERNAL MEDICINE
Payer: MEDICARE

## 2021-11-29 ENCOUNTER — TRANSCRIPTION ENCOUNTER (OUTPATIENT)
Age: 66
End: 2021-11-29

## 2021-11-29 LAB
A1C WITH ESTIMATED AVERAGE GLUCOSE RESULT: 7 % — HIGH (ref 4–5.6)
ANION GAP SERPL CALC-SCNC: 16 MMOL/L — SIGNIFICANT CHANGE UP (ref 5–17)
APTT BLD: 34.6 SEC — SIGNIFICANT CHANGE UP (ref 27.5–35.5)
BASOPHILS # BLD AUTO: 0.03 K/UL — SIGNIFICANT CHANGE UP (ref 0–0.2)
BASOPHILS NFR BLD AUTO: 0.3 % — SIGNIFICANT CHANGE UP (ref 0–2)
BUN SERPL-MCNC: 19 MG/DL — SIGNIFICANT CHANGE UP (ref 7–23)
CALCIUM SERPL-MCNC: 9.9 MG/DL — SIGNIFICANT CHANGE UP (ref 8.4–10.5)
CHLORIDE SERPL-SCNC: 100 MMOL/L — SIGNIFICANT CHANGE UP (ref 96–108)
CHOLEST SERPL-MCNC: 126 MG/DL — SIGNIFICANT CHANGE UP
CO2 SERPL-SCNC: 23 MMOL/L — SIGNIFICANT CHANGE UP (ref 22–31)
CREAT SERPL-MCNC: 0.53 MG/DL — SIGNIFICANT CHANGE UP (ref 0.5–1.3)
EOSINOPHIL # BLD AUTO: 0 K/UL — SIGNIFICANT CHANGE UP (ref 0–0.5)
EOSINOPHIL NFR BLD AUTO: 0 % — SIGNIFICANT CHANGE UP (ref 0–6)
ESTIMATED AVERAGE GLUCOSE: 154 MG/DL — HIGH (ref 68–114)
GLUCOSE BLDC GLUCOMTR-MCNC: 159 MG/DL — HIGH (ref 70–99)
GLUCOSE BLDC GLUCOMTR-MCNC: 162 MG/DL — HIGH (ref 70–99)
GLUCOSE BLDC GLUCOMTR-MCNC: 205 MG/DL — HIGH (ref 70–99)
GLUCOSE BLDC GLUCOMTR-MCNC: 245 MG/DL — HIGH (ref 70–99)
GLUCOSE BLDC GLUCOMTR-MCNC: 249 MG/DL — HIGH (ref 70–99)
GLUCOSE SERPL-MCNC: 265 MG/DL — HIGH (ref 70–99)
HCT VFR BLD CALC: 46.3 % — SIGNIFICANT CHANGE UP (ref 39–50)
HDLC SERPL-MCNC: 42 MG/DL — SIGNIFICANT CHANGE UP
HGB BLD-MCNC: 16 G/DL — SIGNIFICANT CHANGE UP (ref 13–17)
IMM GRANULOCYTES NFR BLD AUTO: 0.7 % — SIGNIFICANT CHANGE UP (ref 0–1.5)
INR BLD: 0.98 — SIGNIFICANT CHANGE UP (ref 0.88–1.16)
LIPID PNL WITH DIRECT LDL SERPL: 70 MG/DL — SIGNIFICANT CHANGE UP
LYMPHOCYTES # BLD AUTO: 1.37 K/UL — SIGNIFICANT CHANGE UP (ref 1–3.3)
LYMPHOCYTES # BLD AUTO: 13.3 % — SIGNIFICANT CHANGE UP (ref 13–44)
MCHC RBC-ENTMCNC: 31.4 PG — SIGNIFICANT CHANGE UP (ref 27–34)
MCHC RBC-ENTMCNC: 34.6 GM/DL — SIGNIFICANT CHANGE UP (ref 32–36)
MCV RBC AUTO: 90.8 FL — SIGNIFICANT CHANGE UP (ref 80–100)
MONOCYTES # BLD AUTO: 0.22 K/UL — SIGNIFICANT CHANGE UP (ref 0–0.9)
MONOCYTES NFR BLD AUTO: 2.1 % — SIGNIFICANT CHANGE UP (ref 2–14)
NEUTROPHILS # BLD AUTO: 8.61 K/UL — HIGH (ref 1.8–7.4)
NEUTROPHILS NFR BLD AUTO: 83.6 % — HIGH (ref 43–77)
NON HDL CHOLESTEROL: 84 MG/DL — SIGNIFICANT CHANGE UP
NRBC # BLD: 0 /100 WBCS — SIGNIFICANT CHANGE UP (ref 0–0)
PLATELET # BLD AUTO: 177 K/UL — SIGNIFICANT CHANGE UP (ref 150–400)
POTASSIUM SERPL-MCNC: 4.2 MMOL/L — SIGNIFICANT CHANGE UP (ref 3.5–5.3)
POTASSIUM SERPL-SCNC: 4.2 MMOL/L — SIGNIFICANT CHANGE UP (ref 3.5–5.3)
PROTHROM AB SERPL-ACNC: 11.8 SEC — SIGNIFICANT CHANGE UP (ref 10.6–13.6)
RBC # BLD: 5.1 M/UL — SIGNIFICANT CHANGE UP (ref 4.2–5.8)
RBC # FLD: 13.2 % — SIGNIFICANT CHANGE UP (ref 10.3–14.5)
SODIUM SERPL-SCNC: 139 MMOL/L — SIGNIFICANT CHANGE UP (ref 135–145)
TRIGL SERPL-MCNC: 70 MG/DL — SIGNIFICANT CHANGE UP
WBC # BLD: 10.3 K/UL — SIGNIFICANT CHANGE UP (ref 3.8–10.5)
WBC # FLD AUTO: 10.3 K/UL — SIGNIFICANT CHANGE UP (ref 3.8–10.5)

## 2021-11-29 PROCEDURE — 93454 CORONARY ARTERY ANGIO S&I: CPT | Mod: 26,XU

## 2021-11-29 PROCEDURE — 92943 PRQ TRLUML REVSC CH OCC ANT: CPT | Mod: LD,59

## 2021-11-29 PROCEDURE — 99221 1ST HOSP IP/OBS SF/LOW 40: CPT

## 2021-11-29 PROCEDURE — 99152 MOD SED SAME PHYS/QHP 5/>YRS: CPT

## 2021-11-29 PROCEDURE — 93010 ELECTROCARDIOGRAM REPORT: CPT

## 2021-11-29 PROCEDURE — 92978 ENDOLUMINL IVUS OCT C 1ST: CPT | Mod: 26,LD,59

## 2021-11-29 RX ORDER — ASPIRIN/CALCIUM CARB/MAGNESIUM 324 MG
81 TABLET ORAL ONCE
Refills: 0 | Status: COMPLETED | OUTPATIENT
Start: 2021-11-29 | End: 2021-11-29

## 2021-11-29 RX ORDER — ASPIRIN/CALCIUM CARB/MAGNESIUM 324 MG
81 TABLET ORAL DAILY
Refills: 0 | Status: DISCONTINUED | OUTPATIENT
Start: 2021-11-30 | End: 2021-11-30

## 2021-11-29 RX ORDER — SODIUM CHLORIDE 9 MG/ML
1000 INJECTION, SOLUTION INTRAVENOUS
Refills: 0 | Status: DISCONTINUED | OUTPATIENT
Start: 2021-11-29 | End: 2021-11-30

## 2021-11-29 RX ORDER — ISOSORBIDE MONONITRATE 60 MG/1
30 TABLET, EXTENDED RELEASE ORAL EVERY 24 HOURS
Refills: 0 | Status: DISCONTINUED | OUTPATIENT
Start: 2021-11-30 | End: 2021-11-30

## 2021-11-29 RX ORDER — SODIUM CHLORIDE 9 MG/ML
500 INJECTION INTRAMUSCULAR; INTRAVENOUS; SUBCUTANEOUS
Refills: 0 | Status: DISCONTINUED | OUTPATIENT
Start: 2021-11-29 | End: 2021-11-30

## 2021-11-29 RX ORDER — DIPHENHYDRAMINE HCL 50 MG
50 CAPSULE ORAL ONCE
Refills: 0 | Status: COMPLETED | OUTPATIENT
Start: 2021-11-29 | End: 2021-11-29

## 2021-11-29 RX ORDER — DEXTROSE 50 % IN WATER 50 %
25 SYRINGE (ML) INTRAVENOUS ONCE
Refills: 0 | Status: DISCONTINUED | OUTPATIENT
Start: 2021-11-29 | End: 2021-11-30

## 2021-11-29 RX ORDER — SODIUM CHLORIDE 9 MG/ML
500 INJECTION INTRAMUSCULAR; INTRAVENOUS; SUBCUTANEOUS
Refills: 0 | Status: DISCONTINUED | OUTPATIENT
Start: 2021-11-29 | End: 2021-11-29

## 2021-11-29 RX ORDER — ATORVASTATIN CALCIUM 80 MG/1
40 TABLET, FILM COATED ORAL AT BEDTIME
Refills: 0 | Status: DISCONTINUED | OUTPATIENT
Start: 2021-11-29 | End: 2021-11-30

## 2021-11-29 RX ORDER — GLUCAGON INJECTION, SOLUTION 0.5 MG/.1ML
1 INJECTION, SOLUTION SUBCUTANEOUS ONCE
Refills: 0 | Status: DISCONTINUED | OUTPATIENT
Start: 2021-11-29 | End: 2021-11-30

## 2021-11-29 RX ORDER — DEXTROSE 50 % IN WATER 50 %
15 SYRINGE (ML) INTRAVENOUS ONCE
Refills: 0 | Status: DISCONTINUED | OUTPATIENT
Start: 2021-11-29 | End: 2021-11-30

## 2021-11-29 RX ORDER — CLOPIDOGREL BISULFATE 75 MG/1
75 TABLET, FILM COATED ORAL DAILY
Refills: 0 | Status: DISCONTINUED | OUTPATIENT
Start: 2021-11-30 | End: 2021-11-30

## 2021-11-29 RX ORDER — INSULIN LISPRO 100/ML
VIAL (ML) SUBCUTANEOUS
Refills: 0 | Status: DISCONTINUED | OUTPATIENT
Start: 2021-11-29 | End: 2021-11-30

## 2021-11-29 RX ORDER — DEXTROSE 50 % IN WATER 50 %
12.5 SYRINGE (ML) INTRAVENOUS ONCE
Refills: 0 | Status: DISCONTINUED | OUTPATIENT
Start: 2021-11-29 | End: 2021-11-30

## 2021-11-29 RX ORDER — METOPROLOL TARTRATE 50 MG
50 TABLET ORAL EVERY 24 HOURS
Refills: 0 | Status: DISCONTINUED | OUTPATIENT
Start: 2021-11-30 | End: 2021-11-30

## 2021-11-29 RX ORDER — CLOPIDOGREL BISULFATE 75 MG/1
600 TABLET, FILM COATED ORAL ONCE
Refills: 0 | Status: COMPLETED | OUTPATIENT
Start: 2021-11-29 | End: 2021-11-29

## 2021-11-29 RX ADMIN — SODIUM CHLORIDE 75 MILLILITER(S): 9 INJECTION INTRAMUSCULAR; INTRAVENOUS; SUBCUTANEOUS at 09:06

## 2021-11-29 RX ADMIN — Medication 4: at 17:53

## 2021-11-29 RX ADMIN — CLOPIDOGREL BISULFATE 600 MILLIGRAM(S): 75 TABLET, FILM COATED ORAL at 09:05

## 2021-11-29 RX ADMIN — Medication 4: at 14:48

## 2021-11-29 RX ADMIN — Medication 2: at 22:22

## 2021-11-29 RX ADMIN — SODIUM CHLORIDE 100 MILLILITER(S): 9 INJECTION INTRAMUSCULAR; INTRAVENOUS; SUBCUTANEOUS at 14:07

## 2021-11-29 RX ADMIN — Medication 81 MILLIGRAM(S): at 09:05

## 2021-11-29 RX ADMIN — Medication 50 MILLIGRAM(S): at 09:09

## 2021-11-29 RX ADMIN — ATORVASTATIN CALCIUM 40 MILLIGRAM(S): 80 TABLET, FILM COATED ORAL at 22:21

## 2021-11-29 NOTE — CONSULT NOTE ADULT - SUBJECTIVE AND OBJECTIVE BOX
Preventive Cardiology Consultation Note    CHIEF COMPLAINT: s/p cardiac catheterization requiring cardiovascular prevention optimization and education    [facility] phone  used  HISTORY OF PRESENT ILLNESS:  Mr. Ureña is a 65yo M current smoker with HTN HL DM CAD who is s/p NICOLE to pLAD and mLAD.     Currently feeling well.   Risk factors reviewed:  HTN, HL, DM: pt thinks all are well controlled based on his family physician. Rosuvastatin dose was recently decreased--he does not think there were any issues with higher dose but that the meds were working well so dose was reduced  Obesity: reports that has lost a lot of weight on his own by exercising    Lifestyle History:  Diet: eats fruits, vegetables, fish, oatmeal, nuts, rare red meats  Mediterranean Diet Score (9 question survey) was 7  (8-9: optimal, 6-7: near-optimal, 4-5: suboptimal, 0-3: markedly suboptimal)  Exercise: Exercises 3 times/week by playing basketball hoop   Smoking: Smoking 1ppd -- says has been discussing this with his cardiologist for past 10 years--used to smoke 2ppd   Sleep Apnea: denies snoring but says has issue with sleep disturbance for many years    PAST MEDICAL & SURGICAL HISTORY:  HTN (hypertension)  HLD (hyperlipidemia)  CAD (coronary artery disease)  DM (diabetes mellitus)    FAMILY HISTORY:   Patient denies any first degree family history of premature CAD or CVA.     Allergies:   No Known Drug Allergies  Seafood (Rash)    HOME MEDICATIONS:  Home Medications:  Aspirin Enteric Coated 81 mg oral delayed release tablet: 1 tab(s) orally once a day (29 Nov 2021 08:41)  isosorbide mononitrate 30 mg oral tablet, extended release: 1 tab(s) orally once a day (in the morning) (29 Nov 2021 08:41)  Jardiance 25 mg oral tablet: 1 tab(s) orally once a day (in the morning) (29 Nov 2021 08:41)  losartan 100 mg oral tablet: 1 tab(s) orally once a day (29 Nov 2021 08:41)  metFORMIN 850 mg oral tablet: 1 tab(s) orally 2 times a day. RESUME 10/7 (29 Nov 2021 08:41)  rosuvastatin 10 mg oral tablet: 1 tab(s) orally once a day (29 Nov 2021 08:41)  Toprol-XL 50 mg oral tablet, extended release: 1 tab(s) orally once a day (29 Nov 2021 08:41)  Vascepa 1 g oral capsule: 2 cap(s) orally 2 times a day (29 Nov 2021 08:41)    PHYSICAL EXAM:  T(C): 37 (11-29-21 @ 14:28), Max: 37 (11-29-21 @ 14:28)  T(F): 98.6 (11-29-21 @ 14:28), Max: 98.6 (11-29-21 @ 14:28)  HR: 75 (11-29-21 @ 15:30) (75 - 91)  BP: 123/67 (11-29-21 @ 15:30) (122/65 - 128/67)  RR: 16 (11-29-21 @ 15:30) (16 - 17)  SpO2: 96% (11-29-21 @ 15:30) (92% - 96%)  Wt(kg): --  Height (cm): 163 (11-29 @ 09:00)  Weight (kg): 95.3 (11-29 @ 09:00)  BMI (kg/m2): 35.9 (11-29 @ 09:00)  	  Gen- awake, conversive  Head-NCAT; eyes: no xanthelasmas   Neck- no JVD  Respiratory- respirations non-labored; clear to auscultation b/l   Cardiovascular- S1S2, RRR, no murmur, no LE edema b/l  Neurology- alert and oriented x 3  Psych- normal affect; appearance, verbalizations, behaviors are appropriate   Skin- warm    LABS:	                       16.0   10.30 )-----------( 177      ( 29 Nov 2021 08:22 )             46.3     11-29    139  |  100  |  19  ----------------------------<  265<H>  4.2   |  23  |  0.53    Ca    9.9      29 Nov 2021 08:23    Cholesterol, Serum: 126 mg/dL (11-29 @ 08:23)  HDL Cholesterol, Serum: 42 mg/dL (11-29 @ 08:23)  Triglycerides, Serum: 70 mg/dL (11-29 @ 08:23)  LDL Cholesterol, Calculated: 70 mg/dL  HbA1c: 7%    ASSESSMENT/RECOMMENDATIONS: 	  Patient's dietary, exercise and overall lifestyle habits were reviewed. The concept of atherosclerosis and its systemic nature was discussed with a focus on the need to get all cardiovascular risk factors to goal. At this time, I would like to make the following recommendations to optimize atherosclerotic risk factors.     Secondary Prevention Recommendations:  Anti-platelet Therapy: DAPT per interventionalist recommendation.   Lipid Therapy: On moderate intensity statin--LDL just at goal at 70. Discussed that ideally would be on higher dose but dose was recently reduced as outpatient--will defer to outpt provider re: higher dose, ideally LDL < 70.   Hypertension: Blood pressures during this stay were well-controlled.   Diet: Mediterranean Diet Score is near-optimal. Some suggestions include using more olive oil.   Exercise: Already has been exercising--once cleared by referring cardiologist, pt would like to do more strenuous exercise.   Smoking: Discussed in detail--at this time he does not want resources to help cut down further or quit--he says he has tried nicotine replacement in the past and would like to discuss this issue further with his cardiologist.   Obesity/Overweight: He has been losing weight on his own with lifestyle modifications--he hopes to lose more once he is able to exercise more. Encouraged continued lifestyle changes. GLP1RA can also be considered as would help with DM/ASCVD risk reduction/weight loss.   Glucometabolic State: Diabetic--A1c at 7%, on SGLT2i.   Sleep Apnea: Would benefit from outpt sleep apnea eval given sleep disturbance and risk factors.     Thank you for the opportunity to see this patient. Please feel free to contact Prevention if there are any questions, or if you feel that your patient would benefit from continued follow-up visits with the Program.    Tawnya Knott MD  Cardiovascular Prevention

## 2021-11-30 ENCOUNTER — TRANSCRIPTION ENCOUNTER (OUTPATIENT)
Age: 66
End: 2021-11-30

## 2021-11-30 VITALS — TEMPERATURE: 98 F

## 2021-11-30 LAB
ANION GAP SERPL CALC-SCNC: 11 MMOL/L — SIGNIFICANT CHANGE UP (ref 5–17)
BUN SERPL-MCNC: 21 MG/DL — SIGNIFICANT CHANGE UP (ref 7–23)
CALCIUM SERPL-MCNC: 8.9 MG/DL — SIGNIFICANT CHANGE UP (ref 8.4–10.5)
CHLORIDE SERPL-SCNC: 107 MMOL/L — SIGNIFICANT CHANGE UP (ref 96–108)
CO2 SERPL-SCNC: 22 MMOL/L — SIGNIFICANT CHANGE UP (ref 22–31)
COVID-19 NUCLEOCAPSID GAM AB INTERP: NEGATIVE — SIGNIFICANT CHANGE UP
COVID-19 NUCLEOCAPSID TOTAL GAM ANTIBODY RESULT: 0.07 INDEX — SIGNIFICANT CHANGE UP
COVID-19 SPIKE DOMAIN AB INTERP: POSITIVE
COVID-19 SPIKE DOMAIN ANTIBODY RESULT: >250 U/ML — HIGH
CREAT SERPL-MCNC: 0.52 MG/DL — SIGNIFICANT CHANGE UP (ref 0.5–1.3)
GLUCOSE BLDC GLUCOMTR-MCNC: 154 MG/DL — HIGH (ref 70–99)
GLUCOSE BLDC GLUCOMTR-MCNC: 157 MG/DL — HIGH (ref 70–99)
GLUCOSE SERPL-MCNC: 161 MG/DL — HIGH (ref 70–99)
HCT VFR BLD CALC: 42.6 % — SIGNIFICANT CHANGE UP (ref 39–50)
HGB BLD-MCNC: 13.9 G/DL — SIGNIFICANT CHANGE UP (ref 13–17)
MAGNESIUM SERPL-MCNC: 1.9 MG/DL — SIGNIFICANT CHANGE UP (ref 1.6–2.6)
MCHC RBC-ENTMCNC: 30.3 PG — SIGNIFICANT CHANGE UP (ref 27–34)
MCHC RBC-ENTMCNC: 32.6 GM/DL — SIGNIFICANT CHANGE UP (ref 32–36)
MCV RBC AUTO: 93 FL — SIGNIFICANT CHANGE UP (ref 80–100)
NRBC # BLD: 0 /100 WBCS — SIGNIFICANT CHANGE UP (ref 0–0)
PLATELET # BLD AUTO: 151 K/UL — SIGNIFICANT CHANGE UP (ref 150–400)
POTASSIUM SERPL-MCNC: 3.9 MMOL/L — SIGNIFICANT CHANGE UP (ref 3.5–5.3)
POTASSIUM SERPL-SCNC: 3.9 MMOL/L — SIGNIFICANT CHANGE UP (ref 3.5–5.3)
RBC # BLD: 4.58 M/UL — SIGNIFICANT CHANGE UP (ref 4.2–5.8)
RBC # FLD: 13.6 % — SIGNIFICANT CHANGE UP (ref 10.3–14.5)
SARS-COV-2 IGG+IGM SERPL QL IA: 0.07 INDEX — SIGNIFICANT CHANGE UP
SARS-COV-2 IGG+IGM SERPL QL IA: >250 U/ML — HIGH
SARS-COV-2 IGG+IGM SERPL QL IA: NEGATIVE — SIGNIFICANT CHANGE UP
SARS-COV-2 IGG+IGM SERPL QL IA: POSITIVE
SODIUM SERPL-SCNC: 140 MMOL/L — SIGNIFICANT CHANGE UP (ref 135–145)
WBC # BLD: 8.1 K/UL — SIGNIFICANT CHANGE UP (ref 3.8–10.5)
WBC # FLD AUTO: 8.1 K/UL — SIGNIFICANT CHANGE UP (ref 3.8–10.5)

## 2021-11-30 RX ORDER — ROSUVASTATIN CALCIUM 5 MG/1
1 TABLET ORAL
Qty: 30 | Refills: 3
Start: 2021-11-30 | End: 2022-03-29

## 2021-11-30 RX ORDER — ROSUVASTATIN CALCIUM 5 MG/1
1 TABLET ORAL
Qty: 0 | Refills: 0 | DISCHARGE

## 2021-11-30 RX ORDER — ICOSAPENT ETHYL 500 MG/1
2 CAPSULE, LIQUID FILLED ORAL
Qty: 0 | Refills: 0 | DISCHARGE

## 2021-11-30 RX ORDER — CLOPIDOGREL BISULFATE 75 MG/1
1 TABLET, FILM COATED ORAL
Qty: 30 | Refills: 11
Start: 2021-11-30 | End: 2022-11-24

## 2021-11-30 RX ORDER — METFORMIN HYDROCHLORIDE 850 MG/1
1 TABLET ORAL
Qty: 0 | Refills: 0 | DISCHARGE

## 2021-11-30 RX ORDER — ASPIRIN/CALCIUM CARB/MAGNESIUM 324 MG
1 TABLET ORAL
Qty: 30 | Refills: 11
Start: 2021-11-30 | End: 2022-11-24

## 2021-11-30 RX ORDER — NICOTINE POLACRILEX 2 MG
1 GUM BUCCAL
Qty: 30 | Refills: 3
Start: 2021-11-30 | End: 2022-03-29

## 2021-11-30 RX ORDER — ASPIRIN/CALCIUM CARB/MAGNESIUM 324 MG
1 TABLET ORAL
Qty: 0 | Refills: 0 | DISCHARGE

## 2021-11-30 RX ORDER — ICOSAPENT ETHYL 500 MG/1
2 CAPSULE, LIQUID FILLED ORAL
Qty: 120 | Refills: 2
Start: 2021-11-30 | End: 2022-02-27

## 2021-11-30 RX ADMIN — Medication 50 MILLIGRAM(S): at 06:21

## 2021-11-30 RX ADMIN — ISOSORBIDE MONONITRATE 30 MILLIGRAM(S): 60 TABLET, EXTENDED RELEASE ORAL at 09:53

## 2021-11-30 RX ADMIN — CLOPIDOGREL BISULFATE 75 MILLIGRAM(S): 75 TABLET, FILM COATED ORAL at 09:54

## 2021-11-30 RX ADMIN — Medication 81 MILLIGRAM(S): at 09:54

## 2021-11-30 RX ADMIN — Medication 2: at 07:25

## 2021-11-30 NOTE — DISCHARGE NOTE PROVIDER - NSDCCPCAREPLAN_GEN_ALL_CORE_FT
PRINCIPAL DISCHARGE DIAGNOSIS  Diagnosis: Coronary artery disease  Assessment and Plan of Treatment: -You underwent a cardiac catheterization (11/29/21 and the blockage in your left anterior descending artery  was opened with stent placement. NEVER MISS A DOSE OF ASPIRIN OR PLAVIX; IF YOU DO, YOU ARE AT RISK OF YOUR STENT CLOSING AND HAVING A HEART ATTACK. DO NOT STOP THESE TWO MEDICATIONS UNLESS INSTRUCTED TO DO SO BY YOUR CARDIOLOGIST. Your procedure was done through your groin or your wrist. You do not need to keep this area covered and you may shower. Please avoid any heavy lifting  (no more than 3 to 5 lbs) or strenuous activity for five days. If you develop any swelling, bleeding, hardening of the skin (hematoma formation), acute pain, numbness/tingling  in your arm or leg please contact your doctor immediately or call our 24/7 line: [phone] Please return to the hospital/seek immediate medical attention if worsening of symptoms- including not limited to chest pain, shortness of breath. Please follow up with Dr. Tejeda in 3 days . Please call his office and make an appointment to see him. Please continue a heart healthy diet low in sodium, cholesterol, and fat.         SECONDARY DISCHARGE DIAGNOSES  Diagnosis: Hypertension  Assessment and Plan of Treatment: Hypertension, commonly called high blood pressure, is when the force of blood pumping through your arteries is too strong. Hypertension forces your heart to work harder to pump blood. Your arteries may become narrow or stiff. Having untreated or uncontrolled hypertension for a long period of time can cause heart attack, stroke, kidney disease, and other problems.   Please continue to taking ****   Maintain a healthy lifestyle and follow up with your primary care physician. For blood pressures at home that are too high or low please see your doctor or go to the Emergency Room as necessary.    Diagnosis: Hyperlipidemia  Assessment and Plan of Treatment: please continue taking Crestor 10mg daily and follow up with Dr. Tejeda       Diagnosis: Diabetes mellitus  Assessment and Plan of Treatment: DO NOT TAKE your Metformin for two days. This medication can interact with the contrast used during your procedure therefore we want to ensure the contrast has left your body prior to you restarting your Metformin. Maintain a low Carbohydrate diet. Check your blood sugar regularly. For blood sugar that is too high or too low please call your Doctor or go to the Emergency Room as necessary. Please follow up with your  Your Hemoglobin A1c is 7.0. Your goal Hemoglobin A1c is less than 7.0%, This number measures your average blood sugar level over the last three months. Ways to lower this number include: diet, exercise, monitoring your fingersticks, adhering to your medication regimen and regular follow up during your Endocrinologist/Primary Care Doctor.     PRINCIPAL DISCHARGE DIAGNOSIS  Diagnosis: Coronary artery disease  Assessment and Plan of Treatment: You were found to have blockages in the arteries of your heart, also known as Coronary Artery Disease. You underwent a cardiac catheterization on 11/29/21 and received three stents to the left anterior descending artery. You have residual blockage in the left circumflex artery that you must return in 4-6weeks to get another stent.  PLEASE CONTINUE ASPIRIN 81MG DAILY AND PLAVIX 75MG DAILY. DO NOT STOP THESE MEDICATIONS FOR ANY REASON AS THEY ARE KEEPING YOUR STENT OPEN AND PREVENTING A HEART ATTACK.   Avoid strenuous activity or heavy lifting anything more than 5lbs for the next five days. Do not take a bath or swim for the next five days; you may shower. For any bleeding or hematoma formation (hardened blood collection under the skin) at the access site of your right wrist and gorin please hold pressure and go to the emergency room. Please follow up with Dr. Tejeda in 3 days. For recurrent chest pain, please call your doctor or go to the emergency room.      SECONDARY DISCHARGE DIAGNOSES  Diagnosis: Hypertension  Assessment and Plan of Treatment: Please continue Metoprolol XL 50m gdaily and Imdur 30mg daily as listed to keep your blood pressure controlled. For blood pressure that is too high or too low please see your doctor or go to the emergency room as necessary.    Diagnosis: Hyperlipidemia  Assessment and Plan of Treatment: Please continue Crestor 10mg at bedtime and Vascepa twice daily to keep your cholesterol low. High cholesterol contributes to heart disease.    Diagnosis: Diabetes mellitus  Assessment and Plan of Treatment: Your Hemoglobin A1c is 7% and your goal A1c is less than 7.0%. This number measures your average blood sugar level over the last three months.  Please continue your diabetic medications as listed. Maintain a low carbohydrate, low sugar diet, exercise, monitor your fingerstick blood sugars regarly and follow up with your Endocrinologist/Primary Care Doctor.

## 2021-11-30 NOTE — DISCHARGE NOTE PROVIDER - HOSPITAL COURSE
66Y M, Cantonese speaking, active smoker, w/ SHELLFISH ALLERGY and PMHx of HTN, HLD, DM-II and known CAD s/p failed PCI of mLAD lesion, presents for staged PCI of mLAD. Pt initially presented to Cardiologist Dr. Tejeda c/o intermittent, non-radiating SSCP described as "pressure" w/ associated SOB that is exacerbated by exertion and alleviated by rest for the past several years. Pt denies diaphoresis, palpitations, dizziness/syncope, LE edema, orthopnea, PND. TTE 8/5/21: LVEF 55-60%, G1DD, (pseudonormal), no significant valvular disease. EST (9/10/21): severe, large sized reversible defect in true apex; as well as anteroseptal and anterior walls from apex to mid ventricle, consistent with inducible ischemia in LAD territory. Pt subsequently underwent diagnostic cardiac cath 10/4/21: unsuccessful intervention to pLAD ; access R radial TR x 2, R groin PC and L groin AS.          Patient was referred for cardiac catheterization on 11/29/21  revealing : atherectomy/shockwave/NICOLE pLAD x2, mLAD x1 (LM normal, LAD prox 100% , mid 80%, LCx prox-mid 70-80%, RCA LI)procedure done via right femoral artery and right radial artery, hemostasis achieved with perclose and radial TR band        Patient see by cardiac prevention team and recommended for smoking cessation, patient refusing nicotine replacement therapy a this time, will follow up with Dr. Tejeda for further management.     Pt. seen and examined at bedside this morning, without complaints and is out of bed ambulating. right radial TR and Groin sites stable without bleeding or hematoma, distal pulses intact. Vital signs stable, labs and telemetry reviewed. Home medication regime reviewed with Dr. Irwin and patient is to continue taking ….. Pt. stable for discharge as per Dr. Irwin and to f/u with Dr. Tejeda in 3 days. Patient has been given appropriate discharge instructions including medication regimen, access site management and follow up. Prescriptions have been e-prescribed to patient's preferred pharmacy.         Dx: Heart Failure this Admit, History of Heart Failure, Unstable Angina/ACS/NSTEMI/STEMI: YES               Cardiac Rehab (STEMI/NSTEMI/ACS/Unstable Angina/CHF):            *Education on benefits of Cardiac Rehab provided to patient: Yes         *Referral and Prescription Given for Cardiac Rehab : Yes         *Pt given list of locations & instructed to contact their insurance company to review list of participating providers     66Y M, Cantonese speaking, active smoker, w/ SHELLFISH ALLERGY and PMHx of HTN, HLD, DM-II and known CAD s/p failed PCI of mLAD lesion, presents for staged PCI of mLAD. Pt initially presented to Cardiologist Dr. Tejeda c/o intermittent, non-radiating SSCP described as "pressure" w/ associated SOB that is exacerbated by exertion and alleviated by rest for the past several years. Pt denies diaphoresis, palpitations, dizziness/syncope, LE edema, orthopnea, PND. TTE 8/5/21: LVEF 55-60%, G1DD, (pseudonormal), no significant valvular disease. EST (9/10/21): severe, large sized reversible defect in true apex; as well as anteroseptal and anterior walls from apex to mid ventricle, consistent with inducible ischemia in LAD territory. Pt subsequently underwent diagnostic cardiac cath 10/4/21: unsuccessful intervention to pLAD ; access R radial TR x 2, R groin PC and L groin AS. In light of patient's risk factors, CCS Class III Anginal symptoms, and known CAD patient now presents to [facility] for staged PCI.    Pt now s/p cardiac cath 11/29/21: atherectomy/shockwave/NICOLE x 2 to pLAD and NICOLE x 1 to mLAD; LM normal, prox/mid LCx 70-80%, RCA luminal irregularities, access R radial TR band and R groin PC. Plan for staged PCI of Lcx in 4-6 weeks. Pt seen and examined at bedside this AM without any complaints or events overnight, VSS, labs and telemetry reviewed and pt stable for discharge as discussed with Dr. Irwin. Pt has received appropriate discharge instructions, including medication regimen, access site management and follow up with Dr. Tejeda in 3 days.    Discharge medications: ASA 81mg QD, Plavix 75mg QD, ______________.    Cardiac Rehab (Post PCI): Education on benefits of Cardiac Rehab provided to patient, Referral and Prescription Given for Cardiac Rehab, Pt given list of locations & instructed to contact their insurance company to review list of participating providers        66Y M, Cantonese speaking, active smoker, w/ SHELLFISH ALLERGY and PMHx of HTN, HLD, DM-II and known CAD s/p failed PCI of mLAD lesion, presents for staged PCI of mLAD. Pt initially presented to Cardiologist Dr. Tejeda c/o intermittent, non-radiating SSCP described as "pressure" w/ associated SOB that is exacerbated by exertion and alleviated by rest for the past several years. Pt denies diaphoresis, palpitations, dizziness/syncope, LE edema, orthopnea, PND. TTE 8/5/21: LVEF 55-60%, G1DD, (pseudonormal), no significant valvular disease. EST (9/10/21): severe, large sized reversible defect in true apex; as well as anteroseptal and anterior walls from apex to mid ventricle, consistent with inducible ischemia in LAD territory. Pt subsequently underwent diagnostic cardiac cath 10/4/21: unsuccessful intervention to pLAD ; access R radial TR x 2, R groin PC and L groin AS. In light of patient's risk factors, CCS Class III Anginal symptoms, and known CAD patient now presents to [facility] for staged PCI.    Pt now s/p cardiac cath 11/29/21: atherectomy/shockwave/NICOLE x 2 to pLAD and NICOLE x 1 to mLAD; LM normal, prox/mid LCx 70-80%, RCA luminal irregularities, access R radial TR band and R groin PC. Plan for staged PCI of Lcx in 4-6 weeks. Pt seen and examined at bedside this AM without any complaints or events overnight, VSS, labs and telemetry reviewed and pt stable for discharge as discussed with Dr. Irwin. Pt has received appropriate discharge instructions, including medication regimen, access site management and follow up with Dr. Tejeda in 3 days.    Discharge medications: ASA 81mg QD, Plavix 75mg QD, Toprol 25mg QD, Lipitor 40mg HS, Imdur 30mg QD.    Cardiac Rehab (Post PCI): Education on benefits of Cardiac Rehab provided to patient, Referral and Prescription Given for Cardiac Rehab, Pt given list of locations & instructed to contact their insurance company to review list of participating providers.

## 2021-11-30 NOTE — DISCHARGE NOTE NURSING/CASE MANAGEMENT/SOCIAL WORK - PATIENT PORTAL LINK FT
You can access the FollowMyHealth Patient Portal offered by [facility] by registering at the following website: [email or web address]. By joining [employer]’s FollowMyHealth portal, you will also be able to view your health information using other applications (apps) compatible with our system.

## 2021-11-30 NOTE — DISCHARGE NOTE PROVIDER - NSDCFUADDINST_GEN_ALL_CORE_FT
Please continue to follow a heart healthy diet, low in sodium, cholesterol and fats. We recommend a Mediterranean diet:  -Incorporate 2 or more servings per day of vegetables, fruits, and whole grains  -Increase intake of fish and legumes/beans to 2 or more servings per week  -Increase intake of healthy fats, such as olive oil and avocados, and have a handful of nuts/seeds most days  -Reduce red/processed meat consumption to 2 or fewer times per week

## 2021-11-30 NOTE — DISCHARGE NOTE PROVIDER - NSDCMRMEDTOKEN_GEN_ALL_CORE_FT
Aspirin Enteric Coated 81 mg oral delayed release tablet: 1 tab(s) orally once a day  isosorbide mononitrate 30 mg oral tablet, extended release: 1 tab(s) orally once a day (in the morning)  Jardiance 25 mg oral tablet: 1 tab(s) orally once a day (in the morning)  losartan 100 mg oral tablet: 1 tab(s) orally once a day  metFORMIN 850 mg oral tablet: 1 tab(s) orally 2 times a day. RESUME 10/7  rosuvastatin 10 mg oral tablet: 1 tab(s) orally once a day  Toprol-XL 50 mg oral tablet, extended release: 1 tab(s) orally once a day  Vascepa 1 g oral capsule: 2 cap(s) orally 2 times a day   Aspirin Enteric Coated 81 mg oral delayed release tablet: 1 tab(s) orally once a day  Cardiac Rehab: 2-3x per week for 12 weeks  Dx- CAD s/p PCI  clopidogrel 75 mg oral tablet: 1 tab(s) orally once a day   isosorbide mononitrate 30 mg oral tablet, extended release: 1 tab(s) orally once a day (in the morning)  Jardiance 25 mg oral tablet: 1 tab(s) orally once a day (in the morning)  losartan 100 mg oral tablet: 1 tab(s) orally once a day  metFORMIN 850 mg oral tablet: 1 tab(s) orally 2 times a day. **RESUME 12/2/21**  nicotine 7 mg/24 hr transdermal film, extended release: 1 patch transdermally once a day   rosuvastatin 10 mg oral tablet: 1 tab(s) orally once a day  Toprol-XL 50 mg oral tablet, extended release: 1 tab(s) orally once a day  Vascepa 1 g oral capsule: 2 cap(s) orally 2 times a day

## 2021-11-30 NOTE — DISCHARGE NOTE PROVIDER - CARE PROVIDER_API CALL
Katelyn Tejeda)  Cardiovascular Medicine  [facility], [facility], [address] [place] [address], [address]  [place] [address]  Phone: [phone]  Fax: [phone]  Follow Up Time: 1-3 days

## 2021-11-30 NOTE — DISCHARGE NOTE NURSING/CASE MANAGEMENT/SOCIAL WORK - NSDCPEFALRISK_GEN_ALL_CORE
For information on Fall & Injury Prevention, visit: [email or web address] OR  [email or web address] OR  https://www.cdc.gov/steadi/patient.html

## 2021-12-07 DIAGNOSIS — Z91.013 ALLERGY TO SEAFOOD: ICD-10-CM

## 2021-12-07 DIAGNOSIS — I25.10 ATHEROSCLEROTIC HEART DISEASE OF NATIVE CORONARY ARTERY WITHOUT ANGINA PECTORIS: ICD-10-CM

## 2021-12-07 DIAGNOSIS — I25.84 CORONARY ATHEROSCLEROSIS DUE TO CALCIFIED CORONARY LESION: ICD-10-CM

## 2021-12-07 DIAGNOSIS — Z79.84 LONG TERM (CURRENT) USE OF ORAL HYPOGLYCEMIC DRUGS: ICD-10-CM

## 2021-12-07 DIAGNOSIS — Z79.82 LONG TERM (CURRENT) USE OF ASPIRIN: ICD-10-CM

## 2021-12-07 DIAGNOSIS — E11.9 TYPE 2 DIABETES MELLITUS WITHOUT COMPLICATIONS: ICD-10-CM

## 2021-12-07 DIAGNOSIS — E66.9 OBESITY, UNSPECIFIED: ICD-10-CM

## 2021-12-07 DIAGNOSIS — I10 ESSENTIAL (PRIMARY) HYPERTENSION: ICD-10-CM

## 2021-12-07 DIAGNOSIS — F17.200 NICOTINE DEPENDENCE, UNSPECIFIED, UNCOMPLICATED: ICD-10-CM

## 2021-12-07 DIAGNOSIS — E78.5 HYPERLIPIDEMIA, UNSPECIFIED: ICD-10-CM

## 2021-12-22 PROCEDURE — 83036 HEMOGLOBIN GLYCOSYLATED A1C: CPT

## 2021-12-22 PROCEDURE — 85027 COMPLETE CBC AUTOMATED: CPT

## 2021-12-22 PROCEDURE — C1760: CPT

## 2021-12-22 PROCEDURE — 85025 COMPLETE CBC W/AUTO DIFF WBC: CPT

## 2021-12-22 PROCEDURE — 80048 BASIC METABOLIC PNL TOTAL CA: CPT

## 2021-12-22 PROCEDURE — C1874: CPT

## 2021-12-22 PROCEDURE — 85610 PROTHROMBIN TIME: CPT

## 2021-12-22 PROCEDURE — 82962 GLUCOSE BLOOD TEST: CPT

## 2021-12-22 PROCEDURE — C1753: CPT

## 2021-12-22 PROCEDURE — C1725: CPT

## 2021-12-22 PROCEDURE — C1887: CPT

## 2021-12-22 PROCEDURE — 93005 ELECTROCARDIOGRAM TRACING: CPT

## 2021-12-22 PROCEDURE — 80061 LIPID PANEL: CPT

## 2021-12-22 PROCEDURE — 83735 ASSAY OF MAGNESIUM: CPT

## 2021-12-22 PROCEDURE — 85730 THROMBOPLASTIN TIME PARTIAL: CPT

## 2021-12-22 PROCEDURE — C1769: CPT

## 2021-12-22 PROCEDURE — 36415 COLL VENOUS BLD VENIPUNCTURE: CPT

## 2021-12-22 PROCEDURE — 86769 SARS-COV-2 COVID-19 ANTIBODY: CPT

## 2021-12-22 PROCEDURE — C1894: CPT

## 2022-01-14 NOTE — DISCHARGE NOTE PROVIDER - NSDCHC_MEDRECSTATUS_GEN_ALL_CORE
Admission Reconciliation is Completed  Discharge Reconciliation is Not Complete Admission Reconciliation is Completed  Discharge Reconciliation is Completed 97

## 2022-03-03 VITALS
TEMPERATURE: 98 F | WEIGHT: 205.03 LBS | RESPIRATION RATE: 16 BRPM | DIASTOLIC BLOOD PRESSURE: 72 MMHG | HEART RATE: 75 BPM | OXYGEN SATURATION: 94 % | HEIGHT: 63.78 IN | SYSTOLIC BLOOD PRESSURE: 133 MMHG

## 2022-03-03 NOTE — H&P ADULT - NSICDXPASTMEDICALHX_GEN_ALL_CORE_FT
PAST MEDICAL HISTORY:  CAD (coronary artery disease)     DM (diabetes mellitus)     Hyperlipidemia     Hypertension

## 2022-03-03 NOTE — H&P ADULT - NSHPLABSRESULTS_GEN_ALL_CORE
15.5   8.70  )-----------( 167      ( 07 Mar 2022 08:20 )             47.6       03-07    139  |  101  |  x   ----------------------------<  x   4.4   |  x   |  x           PT/INR - ( 07 Mar 2022 08:20 )   PT: 12.5 sec;   INR: 1.05          PTT - ( 07 Mar 2022 08:20 )  PTT:37.0 sec              EKG: NSR @ 73 BPM with flattened T waves in I/AVL

## 2022-03-03 NOTE — H&P ADULT - HISTORY OF PRESENT ILLNESS
[place]    Cardiologist: Dr. Tejeda  Covid:  Escort:  Pharmacy:     65 yo M, current everyday smoker (1PPD), PMHx HTN, HLD, DM2, CAD (last cardiac cath 11/29/21 @ [facility] s/p shockwave/NICOLE to p/mLAD  with residual p/mLCx 70-80%, plan for staged LCx PCI if clinically indicated) who presented to Cardiologist Dr. Tejeda c/o non-radiating SSCP described as __ and of mild intensity upon ambulation of 4-5 city blocks. Was only able to walk 1/2 block before previous PCI.     Denies CP, SOB, dizziness, diaphoresis, palpitations, fatigue, LE edema, orthopnea, PND, syncope, N/V, abdominal pain, f/c, sick contact, recent travel.    In light of pt's risk factors, CCS Anginal Class ___ Sx, known physiologically significant CAD, pt referred for staged PCI of p/mLCx 80-80% lesion.  [place]    Cardiologist: Dr. Tejeda  Covid:  Escort:  Pharmacy:  [facility] (in [place])    67 yo M, Cantonese speaking, current everyday smoker (1PPD), SHELLFISH ALLERGY, PMHx HTN, HLD, DM2, CAD (last cardiac cath 11/29/21 @ [facility] s/p shockwave/NICOLE to p/mLAD  with residual p/mLCx 70-80%, plan for staged LCx PCI if clinically indicated) who initially presented to Cardiologist Dr. Tejeda c/o non-radiating SSCP with associated WONG described as mild intensity upon ambulation 1/2 city block. Since last cath, pt states he can now walk 4-5 city blocks before CP/WONG. Denies dizziness, diaphoresis, palpitations, fatigue, LE edema, orthopnea, PND, syncope, N/V, abdominal pain, f/c, sick contact, recent travel. TTE 8/5/21: LVEF 55-60%, G1DD, (pseudonormal), no significant valvular disease. EST (9/10/21): severe, large sized reversible defect in true apex; as well as anteroseptal and anterior walls from apex to mid ventricle, consistent with inducible ischemia in LAD territory.  In light of pt's risk factors, initial CCS Anginal Class III Sx, known physiologically significant CAD, pt referred for staged PCI of p/mLCx 80-80% lesion.  [place]  Shellfish allergy --> rash    Cardiologist: Dr. Tejeda  Covid:  Escort:  Pharmacy:  [facility] (in [place])    67 yo M, Cantonese speaking, current everyday smoker (1PPD), SHELLFISH ALLERGY, PMHx HTN, HLD, DM2, CAD (last cardiac cath 11/29/21 @ [facility] s/p shockwave/NICOLE to p/mLAD  with residual p/mLCx 70-80%, plan for staged LCx PCI if clinically indicated) who initially presented to Cardiologist Dr. Tejeda c/o non-radiating SSCP with associated WONG described as mild intensity upon ambulation 1/2 city block. Since last cath, pt states he can now walk 4-5 city blocks before CP/WONG. Denies dizziness, diaphoresis, palpitations, fatigue, LE edema, orthopnea, PND, syncope, N/V, abdominal pain, f/c, sick contact, recent travel. TTE 8/5/21: LVEF 55-60%, G1DD, (pseudonormal), no significant valvular disease. EST (9/10/21): severe, large sized reversible defect in true apex; as well as anteroseptal and anterior walls from apex to mid ventricle, consistent with inducible ischemia in LAD territory.  In light of pt's risk factors, initial CCS Anginal Class III Sx, known physiologically significant CAD, pt referred for staged PCI of p/mLCx 80-80% lesion.  [place]  Shellfish allergy --> rash  Has 2 MRNs - [id number] is other MRN    Cardiologist: Dr. Tejeda  Covid:  Escort:  Pharmacy:  [facility] (in [place])    67 yo M, Cantonese speaking, current everyday smoker (1PPD), SHELLFISH ALLERGY, PMHx HTN, HLD, DM2, CAD (last cardiac cath 11/29/21 @ [facility] s/p shockwave/NICOLE to p/mLAD  with residual p/mLCx 70-80%, plan for staged LCx PCI if clinically indicated) who initially presented to Cardiologist Dr. Tejeda c/o non-radiating SSCP with associated WONG described as mild intensity upon ambulation 1/2 city block. Since last cath, pt states he can now walk 4-5 city blocks before CP/WONG. Denies dizziness, diaphoresis, palpitations, fatigue, LE edema, orthopnea, PND, syncope, N/V, abdominal pain, f/c, sick contact, recent travel. TTE 8/5/21: LVEF 55-60%, G1DD, (pseudonormal), no significant valvular disease. EST (9/10/21): severe, large sized reversible defect in true apex; as well as anteroseptal and anterior walls from apex to mid ventricle, consistent with inducible ischemia in LAD territory.  In light of pt's risk factors, initial CCS Anginal Class III Sx, known physiologically significant CAD, pt referred for staged PCI of p/mLCx 80-80% lesion.    Shellfish allergy --> rash  Has 2 MRNs - [id number] is other MRN    Cardiologist: Dr. Tejeda  Covid: COVID 3/4 @ [address] [place]  Escort:  Pharmacy:  [facility] (in [place])    65 yo M, Cantonese speaking, current everyday smoker (1PPD), SHELLFISH ALLERGY, PMHx HTN, HLD, DM2, CAD (last cardiac cath 11/29/21 @ [facility] s/p shockwave/NICOLE to p/mLAD  with residual p/mLCx 70-80%, plan for staged LCx PCI if clinically indicated) who initially presented to Cardiologist Dr. Tejeda c/o non-radiating SSCP with associated WONG described as mild intensity upon ambulation 1/2 city block. Since last cath, pt states he can now walk 4-5 city blocks before CP/WONG. Denies dizziness, diaphoresis, palpitations, fatigue, LE edema, orthopnea, PND, syncope, N/V, abdominal pain, f/c, sick contact, recent travel. TTE 8/5/21: LVEF 55-60%, G1DD, (pseudonormal), no significant valvular disease. EST (9/10/21): severe, large sized reversible defect in true apex; as well as anteroseptal and anterior walls from apex to mid ventricle, consistent with inducible ischemia in LAD territory.  In light of pt's risk factors, initial CCS Anginal Class III Sx, known physiologically significant CAD, pt referred for staged PCI of p/mLCx 80-80% lesion.    Shellfish allergy --> rash  Has 2 MRNs - [id number] is other MRN (with previous admissions for cath)     Cardiologist: Dr. 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 Tejeda c/o non-radiating SSCP with associated WONG described as mild intensity upon ambulation 1/2 city block. Since last cath, pt states he can now walk 4-5 city blocks before CP/WONG.Pt subsequently underwent diagnostic cardiac cath 10/4/21: unsuccessful intervention to pLAD ;subsquently patient returned for staged PCI. s/p cardiac cath 11/29/21: atherectomy/shockwave/NICOLE x 2 to pLAD and NICOLE x 1 to mLAD; LM normal, prox/mid LCx 70-80%, RCA luminal irregularities. Patient planned for staged PCI of Lcx in 4-6 weeks. Since procedure patient              Denies dizziness, diaphoresis, palpitations, fatigue, LE edema, orthopnea, PND, syncope, N/V, abdominal pain, f/c, sick contact, recent travel. TTE 8/5/21: LVEF 55-60%, G1DD, (pseudonormal), no significant valvular disease. EST (9/10/21): severe, large sized reversible defect in true apex; as well as anteroseptal and anterior walls from apex to mid ventricle, consistent with inducible ischemia in LAD territory.  In light of pt's risk factors, initial CCS Anginal Class III Sx, known physiologically significant CAD, pt referred for staged PCI of p/mLCx 80-80% lesion.    Shellfish allergy --> rash sent Rx for prednisone (was sent on previous admissions)   Has 2 MRNs - [id number] is other MRN (with previous admissions for cath)     Cardiologist: Dr. 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 Tejeda c/o non-radiating SSCP with associated WONG described as mild intensity upon ambulation 1/2 city block. Since last cath, pt states he can now walk 4-5 city blocks before CP/WONG.Pt subsequently underwent diagnostic cardiac cath 10/4/21: unsuccessful intervention to pLAD ;subsquently patient returned for staged PCI. s/p cardiac cath 11/29/21: atherectomy/shockwave/NICOLE x 2 to pLAD and NICOLE x 1 to mLAD; LM normal, prox/mid LCx 70-80%, RCA luminal irregularities. Patient planned for staged PCI of Lcx in 4-6 weeks. Since procedure patient              Denies dizziness, diaphoresis, palpitations, fatigue, LE edema, orthopnea, PND, syncope, N/V, abdominal pain, f/c, sick contact, recent travel. TTE 8/5/21: LVEF 55-60%, G1DD, (pseudonormal), no significant valvular disease. EST (9/10/21): severe, large sized reversible defect in true apex; as well as anteroseptal and anterior walls from apex to mid ventricle, consistent with inducible ischemia in LAD territory.  In light of pt's risk factors, initial CCS Anginal Class III Sx, known physiologically significant CAD, pt referred for staged PCI of p/mLCx 80-80% lesion.    Shellfish allergy --> rash sent Rx for prednisone (was sent on previous admissions)   Has 2 MRNs - [id number] is other MRN (with previous admissions for cath)     Cardiologist: Dr. 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 Tejeda c/o non-radiating SSCP with associated WONG described as mild intensity upon ambulation 1/2 city block. EST (9/10/21): severe, large sized reversible defect in true apex; as well as anteroseptal and anterior walls from apex to mid ventricle, consistent with inducible ischemia in LAD territory. Pt subsequently underwent diagnostic cardiac cath 10/4/21: unsuccessful intervention to pLAD ; subsquently patient returned for staged PCI. s/p cardiac cath 11/29/21: atherectomy/shockwave/NICOLE x 2 to pLAD and NICOLE x 1 to mLAD; LM normal, prox/mid LCx 70-80%, RCA luminal irregularities. Patient planned for staged PCI of Lcx in 4-6 weeks. Since procedure patient has been feeling well, oc             Denies dizziness, diaphoresis, palpitations, fatigue, LE edema, orthopnea, PND, syncope, N/V, abdominal pain, f/c, sick contact, recent travel. TTE 8/5/21: LVEF 55-60%, G1DD, (pseudonormal), no significant valvular disease. In light of pt's risk factors, initial CCS Anginal Class III Sx, known physiologically significant CAD, pt referred for staged PCI of p/mLCx 80-80% lesion.    Shellfish allergy --> rash sent Rx for prednisone (was sent on previous admissions) - confirm patient took   Has 2 MRNs - [id number] is other MRN (with previous admissions for cath)     Cardiologist: Dr. 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 Tejeda c/o non-radiating SSCP with associated WONG described as mild intensity upon ambulation 1/2 city block. EST (9/10/21): severe, large sized reversible defect in true apex; as well as anteroseptal and anterior walls from apex to mid ventricle, consistent with inducible ischemia in LAD territory. Pt subsequently underwent diagnostic cardiac cath 10/4/21: unsuccessful intervention to pLAD ; subsquently patient returned for staged PCI. s/p cardiac cath 11/29/21: atherectomy/shockwave/NICOLE x 2 to pLAD and NICOLE x 1 to mLAD; LM normal, prox/mid LCx 70-80%, RCA luminal irregularities. Patient planned for staged PCI of Lcx in 4-6 weeks. Since procedure patient has been feeling well, denies chest pain shortness of breath, syncope, dizziness. Patient endorses compliance with DAPT  TTE 8/5/21: LVEF 55-60%, G1DD, (pseudonormal), no significant valvular disease. In light of pt's risk factors, initial CCS Anginal Class III Sx, known physiologically significant CAD, pt referred for staged PCI of p/mLCx 80-80% lesion.    Shellfish allergy --> rash sent Rx for prednisone (was sent on previous admissions) - confirm patient took   Has 2 MRNs - [id number] is other MRN (with previous admissions for cath)     Cardiologist: Dr. Tejeda  Covid: COVID 3/4 @ [address]- patient states same place as last time--> results from that visit in [facility]--> f/u   Escort: Staged PCI  Pharmacy:  [facility] (in [place])    67 yo M, Cantonese speaking, current everyday smoker (1PPD), SHELLFISH ALLERGY, PMHx HTN, HLD, DM2, CAD (w/ recent PCI see below) who initially presented to Cardiologist Dr. Tejeda c/o non-radiating SSCP with associated WONG described as mild intensity upon ambulation 1/2 city block. EST (9/10/21): severe, large sized reversible defect in true apex; as well as anteroseptal and anterior walls from apex to mid ventricle, consistent with inducible ischemia in LAD territory. Pt subsequently underwent diagnostic cardiac cath 10/4/21: unsuccessful intervention to pLAD ; subsquently patient returned for staged PCI. s/p cardiac cath 11/29/21: atherectomy/shockwave/NICOLE x 2 to pLAD and NICOLE x 1 to mLAD; LM normal, prox/mid LCx 70-80%, RCA luminal irregularities. Patient planned for staged PCI of Lcx in 4-6 weeks. Since procedure patient has been feeling well, denies chest pain shortness of breath, syncope, dizziness. Patient endorses compliance with DAPT  TTE 8/5/21: LVEF 55-60%, G1DD, (pseudonormal), no significant valvular disease. In light of pt's risk factors, initial CCS Anginal Class III Sx, known physiologically significant CAD, pt referred for staged PCI of p/mLCx 80-80% lesion.  Shellfish allergy --> rash sent Rx for prednisone (was sent on previous admissions) - confirm patient took   Has 2 MRNs - [id number] is other MRN (with previous admissions for cath)     Cardiologist: Dr. Tejeda  Covid: COVID 3/4 @ [address] [place]- patient states same place as last time--> results from that visit in [facility]--> f/u   Escort: Staged PCI  Pharmacy:  [facility] (in [place])    65 yo M, Cantonese speaking, current everyday smoker (1PPD), SHELLFISH ALLERGY, PMHx HTN, HLD, DM2, CAD (w/ recent PCI see below) who initially presented to Cardiologist Dr. Tejeda c/o non-radiating SSCP with associated WONG described as mild intensity upon ambulation 1/2 city block. EST (9/10/21): severe, large sized reversible defect in true apex; as well as anteroseptal and anterior walls from apex to mid ventricle, consistent with inducible ischemia in LAD territory. Pt subsequently underwent diagnostic cardiac cath 10/4/21: unsuccessful intervention to pLAD ; subsquently patient returned for staged PCI. s/p cardiac cath 11/29/21: atherectomy/shockwave/NICOLE x 2 to pLAD and NICOLE x 1 to mLAD; LM normal, prox/mid LCx 70-80%, RCA luminal irregularities. Patient planned for staged PCI of Lcx in 4-6 weeks. Since procedure patient has been feeling well, denies chest pain shortness of breath, syncope, dizziness. Patient endorses compliance with DAPT  TTE 8/5/21: LVEF 55-60%, G1DD, (pseudonormal), no significant valvular disease. In light of pt's risk factors, initial CCS Anginal Class III Sx, known physiologically significant CAD, pt referred for staged PCI of p/mLCx 80-80% lesion.  Shellfish allergy --> rash sent Rx for prednisone (was sent on previous admissions) - confirm patient took   Has 2 MRNs - [id number] is other MRN (with previous admissions for cath)     Cardiologist: Dr. Tejeda  Covid: COVID 3/4 @ [address] [place]- patient states same place as last time--> results from that visit in [facility]--> f/u   Escort: Staged PCI  Pharmacy:  [facility] (in [place])    67 yo M, Cantonese speaking, current everyday smoker (1PPD), SHELLFISH ALLERGY, PMHx HTN, HLD, DM2, CAD (w/ recent PCI see below) who initially presented to Cardiologist Dr. Tejeda c/o non-radiating SSCP with associated WONG described as mild intensity upon ambulation 1/2 city block. EST (9/10/21): severe, large sized reversible defect in true apex; as well as anteroseptal and anterior walls from apex to mid ventricle, consistent with inducible ischemia in LAD territory. Pt subsequently underwent diagnostic cardiac cath 10/4/21: unsuccessful intervention to pLAD ; subsquently patient returned for staged PCI. s/p cardiac cath 11/29/21: atherectomy/shockwave/NICOLE x 2 to pLAD and NICOLE x 1 to mLAD; LM normal, prox/mid LCx 70-80%, RCA luminal irregularities. Patient planned for staged PCI of Lcx in 4-6 weeks. Since procedure patient has been feeling better, with improved exertional chest pain but still significant exertional dyspnea but denies syncope, dizziness. Patient endorses compliance with DAPT  TTE 8/5/21: LVEF 55-60%, G1DD, (pseudonormal), no significant valvular disease. In light of pt's risk factors, residual CCS Anginal Class II-III Sx, known physiologically significant CAD, pt referred for staged PCI of p/mLCx 80-80% lesion.  Shellfish allergy --> rash sent Rx for prednisone (was sent on previous admissions) - confirm patient took     Cardiologist: Dr. Tejeda  Covid: COVID 3/4 negative, printed, filed in chart  Escort: Staged PCI  Pharmacy:  [facility] (in [place])    67 yo M, Cantonese speaking, current everyday smoker (1PPD), SHELLFISH ALLERGY, PMHx HTN, HLD, DM2, CAD (w/ recent PCI see below) who initially presented to Cardiologist Dr. Tejeda c/o non-radiating SSCP with associated WONG described as mild intensity upon ambulation 1/2 city block. EST (9/10/21): severe, large sized reversible defect in true apex; as well as anteroseptal and anterior walls from apex to mid ventricle, consistent with inducible ischemia in LAD territory. Pt subsequently underwent diagnostic cardiac cath 10/4/21: unsuccessful intervention to pLAD ; subsquently patient returned for staged PCI. s/p cardiac cath 11/29/21: atherectomy/shockwave/NICOLE x 2 to pLAD and NICOLE x 1 to mLAD; LM normal, prox/mid LCx 70-80%, RCA luminal irregularities. Patient planned for staged PCI of Lcx in 4-6 weeks. Since procedure patient has been feeling better, with improved exertional chest pain but still significant exertional dyspnea but denies syncope, dizziness. Patient endorses compliance with DAPT  TTE 8/5/21: LVEF 55-60%, G1DD, (pseudonormal), no significant valvular disease. In light of pt's risk factors, residual CCS Anginal Class II-III Sx, known physiologically significant CAD, pt referred for staged PCI of p/mLCx 80-80% lesion.  Shellfish allergy --> rash sent Rx for prednisone (was sent on previous admissions)     Cardiologist: Dr. Tejeda  Covid: COVID 3/4 negative, printed, filed in chart  Escort: Staged PCI  Pharmacy:  [facility] (in [place])    67 yo M, Cantonese speaking, current everyday smoker (1PPD), SHELLFISH ALLERGY, PMHx HTN, HLD, DM2, CAD (w/ recent PCI see below) who initially presented to Cardiologist Dr. Tejeda c/o non-radiating SSCP with associated WONG described as mild intensity upon ambulation 1/2 city block. EST (9/10/21): severe, large sized reversible defect in true apex; as well as anteroseptal and anterior walls from apex to mid ventricle, consistent with inducible ischemia in LAD territory. Pt subsequently underwent diagnostic cardiac cath 10/4/21: unsuccessful intervention to pLAD ; subsquently patient returned for staged PCI. s/p cardiac cath 11/29/21: atherectomy/shockwave/NICOLE x 2 to pLAD and NICOLE x 1 to mLAD; LM normal, prox/mid LCx 70-80%, RCA luminal irregularities. Patient planned for staged PCI of Lcx in 4-6 weeks. Since procedure patient has been feeling better, with improved exertional chest pain but still significant exertional dyspnea but denies syncope, dizziness. Patient endorses compliance with DAPT  TTE 8/5/21: LVEF 55-60%, G1DD, (pseudonormal), no significant valvular disease. In light of pt's risk factors, residual CCS Anginal Class II-III Sx, known physiologically significant CAD, pt referred for staged PCI of p/mLCx 80-80% lesion.  Shellfish allergy --> rash sent Rx for prednisone (was sent on previous admissions)     Cardiologist: Dr. Tejeda  Covid: COVID 3/4 negative, printed, filed in chart  Escort: Staged PCI  Pharmacy:  [facility] (in [place]) -- medications verified by medication bottles that he brought with him, stated he did not take more meds than what he brought (reliable)    65 yo M, Cantonese speaking, current everyday smoker (1PPD), SHELLFISH ALLERGY, PMHx HTN, HLD, DM2, CAD (w/ recent PCI see below) who initially presented to Cardiologist Dr. Tejeda c/o non-radiating SSCP with associated WONG described as mild intensity upon ambulation 1/2 city block. EST (9/10/21): severe, large sized reversible defect in true apex; as well as anteroseptal and anterior walls from apex to mid ventricle, consistent with inducible ischemia in LAD territory. Pt subsequently underwent diagnostic cardiac cath 10/4/21: unsuccessful intervention to pLAD ; subsquently patient returned for staged PCI. s/p cardiac cath 11/29/21: atherectomy/shockwave/NICOLE x 2 to pLAD and NICOLE x 1 to mLAD; LM normal, prox/mid LCx 70-80%, RCA luminal irregularities. Patient planned for staged PCI of Lcx in 4-6 weeks. Since procedure patient has been feeling better, with improved exertional chest pain but still significant exertional dyspnea but denies syncope, dizziness. Patient endorses compliance with DAPT  TTE 8/5/21: LVEF 55-60%, G1DD, (pseudonormal), no significant valvular disease. In light of pt's risk factors, residual CCS Anginal Class II-III Sx, known physiologically significant CAD, pt referred for staged PCI of p/mLCx 80-80% lesion.

## 2022-03-03 NOTE — H&P ADULT - ASSESSMENT
67 yo M, Cantonese speaking, current everyday smoker (1PPD), SHELLFISH ALLERGY, PMHx HTN, HLD, DM2, CAD (w/ recent PCI see below) who initially presented to Cardiologist Dr. Tejeda c/o non-radiating SSCP with associated WONG described as mild intensity upon ambulation 1/2 city block. In light of pt's risk factors, initial CCS Anginal Class III Sx, known physiologically significant CAD, pt referred for staged PCI of p/mLCx 80-80% lesion.     ASA Class III    Mallampati Class III    Risks & benefits of procedure and alternative therapy have been explained to the patient including but not limited to: allergic reaction, bleeding with possible need for blood transfusion, infection, renal and vascular compromise, limb damage, arrhythmia, stroke, vessel dissection/perforation, Myocardial infarction, emergent CABG. Informed consent obtained and in chart.       Patient a candidate for sedation: Yes    Sedation Type: Moderate   65 yo M, Cantonese speaking, current everyday smoker (1PPD), SHELLFISH ALLERGY, PMHx HTN, HLD, DM2, CAD (w/ recent PCI see below) who initially presented to Cardiologist Dr. Tejeda c/o non-radiating SSCP with associated WONG described as mild intensity upon ambulation 1/2 city block. In light of pt's risk factors, residual CCS Anginal Class II-III Sx despite LAD PCI, known physiologically significant CAD, pt referred for staged PCI of p/mLCx 80-80% lesion.     ASA Class III    Mallampati Class III    Risks & benefits of procedure and alternative therapy have been explained to the patient including but not limited to: allergic reaction, bleeding with possible need for blood transfusion, infection, renal and vascular compromise, limb damage, arrhythmia, stroke, vessel dissection/perforation, Myocardial infarction, emergent CABG. Informed consent obtained and in chart.       Patient a candidate for sedation: Yes    Sedation Type: Moderate   65 yo M, Cantonese speaking, current everyday smoker (1PPD), SHELLFISH ALLERGY, PMHx HTN, HLD, DM2, CAD (w/ recent PCI see below) who initially presented to Cardiologist Dr. Tejeda c/o non-radiating SSCP with associated WONG described as mild intensity upon ambulation 1/2 city block. In light of pt's risk factors, residual CCS Anginal Class II-III Sx despite LAD PCI, known physiologically significant CAD, pt referred for staged PCI of p/mLCx 80-80% lesion.     ASA Class III    Mallampati Class III    NS  cc bolus x1 per hydration protocol. Next in the room.     Took DAPT on 3/6/22 and endorses daily compliance. Will provide Ecotrin 81 mg PO x1 & Plavix 75 mg PO x1 pre-cath.     Noted crab allergy. Premedication was sent last cath. Prednisone 50 mg PO q6hr x 3 doses sent (last dose 3/7/22 @ 6am). Endorses compliance. Will provide Benadryl 50 mg IV x1 on call to the cath  lab.     Consent obtained with assistance of [employer]e [employer]  Rose Mary #[id number]    Risks & benefits of procedure and alternative therapy have been explained to the patient including but not limited to: allergic reaction, bleeding with possible need for blood transfusion, infection, renal and vascular compromise, limb damage, arrhythmia, stroke, vessel dissection/perforation, Myocardial infarction, emergent CABG. Informed consent obtained and in chart.       Patient a candidate for sedation: Yes    Sedation Type: Moderate

## 2022-03-07 ENCOUNTER — INPATIENT (INPATIENT)
Facility: HOSPITAL | Age: 67
LOS: 0 days | Discharge: HOME | DRG: 247 | End: 2022-03-08
Attending: INTERNAL MEDICINE | Admitting: INTERNAL MEDICINE
Payer: MEDICARE

## 2022-03-07 ENCOUNTER — TRANSCRIPTION ENCOUNTER (OUTPATIENT)
Age: 67
End: 2022-03-07

## 2022-03-07 LAB
A1C WITH ESTIMATED AVERAGE GLUCOSE RESULT: 7.5 % — HIGH (ref 4–5.6)
ALBUMIN SERPL ELPH-MCNC: 4.7 G/DL — SIGNIFICANT CHANGE UP (ref 3.3–5)
ALP SERPL-CCNC: 91 U/L — SIGNIFICANT CHANGE UP (ref 40–120)
ALT FLD-CCNC: 34 U/L — SIGNIFICANT CHANGE UP (ref 10–45)
ANION GAP SERPL CALC-SCNC: 16 MMOL/L — SIGNIFICANT CHANGE UP (ref 5–17)
APTT BLD: 37 SEC — HIGH (ref 27.5–35.5)
AST SERPL-CCNC: 20 U/L — SIGNIFICANT CHANGE UP (ref 10–40)
BASOPHILS # BLD AUTO: 0.03 K/UL — SIGNIFICANT CHANGE UP (ref 0–0.2)
BASOPHILS NFR BLD AUTO: 0.3 % — SIGNIFICANT CHANGE UP (ref 0–2)
BILIRUB SERPL-MCNC: 0.8 MG/DL — SIGNIFICANT CHANGE UP (ref 0.2–1.2)
BUN SERPL-MCNC: 18 MG/DL — SIGNIFICANT CHANGE UP (ref 7–23)
CALCIUM SERPL-MCNC: 9.9 MG/DL — SIGNIFICANT CHANGE UP (ref 8.4–10.5)
CHLORIDE SERPL-SCNC: 101 MMOL/L — SIGNIFICANT CHANGE UP (ref 96–108)
CHOLEST SERPL-MCNC: 114 MG/DL — SIGNIFICANT CHANGE UP
CK MB CFR SERPL CALC: 1.6 NG/ML — SIGNIFICANT CHANGE UP (ref 0–6.7)
CK SERPL-CCNC: 59 U/L — SIGNIFICANT CHANGE UP (ref 30–200)
CO2 SERPL-SCNC: 22 MMOL/L — SIGNIFICANT CHANGE UP (ref 22–31)
CREAT SERPL-MCNC: 0.53 MG/DL — SIGNIFICANT CHANGE UP (ref 0.5–1.3)
EGFR: 111 ML/MIN/1.73M2 — SIGNIFICANT CHANGE UP
EOSINOPHIL # BLD AUTO: 0 K/UL — SIGNIFICANT CHANGE UP (ref 0–0.5)
EOSINOPHIL NFR BLD AUTO: 0 % — SIGNIFICANT CHANGE UP (ref 0–6)
ESTIMATED AVERAGE GLUCOSE: 169 MG/DL — HIGH (ref 68–114)
GLUCOSE BLDC GLUCOMTR-MCNC: 139 MG/DL — HIGH (ref 70–99)
GLUCOSE BLDC GLUCOMTR-MCNC: 161 MG/DL — HIGH (ref 70–99)
GLUCOSE BLDC GLUCOMTR-MCNC: 210 MG/DL — HIGH (ref 70–99)
GLUCOSE BLDC GLUCOMTR-MCNC: 228 MG/DL — HIGH (ref 70–99)
GLUCOSE SERPL-MCNC: 265 MG/DL — HIGH (ref 70–99)
HCT VFR BLD CALC: 47.6 % — SIGNIFICANT CHANGE UP (ref 39–50)
HDLC SERPL-MCNC: 41 MG/DL — SIGNIFICANT CHANGE UP
HGB BLD-MCNC: 15.5 G/DL — SIGNIFICANT CHANGE UP (ref 13–17)
IMM GRANULOCYTES NFR BLD AUTO: 0.7 % — SIGNIFICANT CHANGE UP (ref 0–1.5)
INR BLD: 1.05 — SIGNIFICANT CHANGE UP (ref 0.88–1.16)
LIPID PNL WITH DIRECT LDL SERPL: 59 MG/DL — SIGNIFICANT CHANGE UP
LYMPHOCYTES # BLD AUTO: 1.06 K/UL — SIGNIFICANT CHANGE UP (ref 1–3.3)
LYMPHOCYTES # BLD AUTO: 12.2 % — LOW (ref 13–44)
MCHC RBC-ENTMCNC: 29.5 PG — SIGNIFICANT CHANGE UP (ref 27–34)
MCHC RBC-ENTMCNC: 32.6 GM/DL — SIGNIFICANT CHANGE UP (ref 32–36)
MCV RBC AUTO: 90.5 FL — SIGNIFICANT CHANGE UP (ref 80–100)
MONOCYTES # BLD AUTO: 0.09 K/UL — SIGNIFICANT CHANGE UP (ref 0–0.9)
MONOCYTES NFR BLD AUTO: 1 % — LOW (ref 2–14)
NEUTROPHILS # BLD AUTO: 7.46 K/UL — HIGH (ref 1.8–7.4)
NEUTROPHILS NFR BLD AUTO: 85.8 % — HIGH (ref 43–77)
NON HDL CHOLESTEROL: 73 MG/DL — SIGNIFICANT CHANGE UP
NRBC # BLD: 0 /100 WBCS — SIGNIFICANT CHANGE UP (ref 0–0)
PLATELET # BLD AUTO: 167 K/UL — SIGNIFICANT CHANGE UP (ref 150–400)
POTASSIUM SERPL-MCNC: 4.4 MMOL/L — SIGNIFICANT CHANGE UP (ref 3.5–5.3)
POTASSIUM SERPL-SCNC: 4.4 MMOL/L — SIGNIFICANT CHANGE UP (ref 3.5–5.3)
PROT SERPL-MCNC: 8.6 G/DL — HIGH (ref 6–8.3)
PROTHROM AB SERPL-ACNC: 12.5 SEC — SIGNIFICANT CHANGE UP (ref 10.5–13.4)
RBC # BLD: 5.26 M/UL — SIGNIFICANT CHANGE UP (ref 4.2–5.8)
RBC # FLD: 13.5 % — SIGNIFICANT CHANGE UP (ref 10.3–14.5)
SODIUM SERPL-SCNC: 139 MMOL/L — SIGNIFICANT CHANGE UP (ref 135–145)
TRIGL SERPL-MCNC: 68 MG/DL — SIGNIFICANT CHANGE UP
WBC # BLD: 8.7 K/UL — SIGNIFICANT CHANGE UP (ref 3.8–10.5)
WBC # FLD AUTO: 8.7 K/UL — SIGNIFICANT CHANGE UP (ref 3.8–10.5)

## 2022-03-07 PROCEDURE — 92978 ENDOLUMINL IVUS OCT C 1ST: CPT | Mod: 26,LC

## 2022-03-07 PROCEDURE — 92928 PRQ TCAT PLMT NTRAC ST 1 LES: CPT | Mod: LC

## 2022-03-07 PROCEDURE — 93010 ELECTROCARDIOGRAM REPORT: CPT

## 2022-03-07 PROCEDURE — 99152 MOD SED SAME PHYS/QHP 5/>YRS: CPT

## 2022-03-07 PROCEDURE — 92920 PRQ TRLUML C ANGIOP 1ART&/BR: CPT | Mod: RI

## 2022-03-07 RX ORDER — INSULIN LISPRO 100/ML
VIAL (ML) SUBCUTANEOUS ONCE
Refills: 0 | Status: COMPLETED | OUTPATIENT
Start: 2022-03-07 | End: 2022-03-07

## 2022-03-07 RX ORDER — ROSUVASTATIN CALCIUM 5 MG/1
1 TABLET ORAL
Qty: 30 | Refills: 2
Start: 2022-03-07 | End: 2022-06-04

## 2022-03-07 RX ORDER — SODIUM CHLORIDE 9 MG/ML
1000 INJECTION, SOLUTION INTRAVENOUS
Refills: 0 | Status: DISCONTINUED | OUTPATIENT
Start: 2022-03-07 | End: 2022-03-08

## 2022-03-07 RX ORDER — SODIUM CHLORIDE 9 MG/ML
250 INJECTION INTRAMUSCULAR; INTRAVENOUS; SUBCUTANEOUS ONCE
Refills: 0 | Status: COMPLETED | OUTPATIENT
Start: 2022-03-07 | End: 2022-03-07

## 2022-03-07 RX ORDER — CLOPIDOGREL BISULFATE 75 MG/1
75 TABLET, FILM COATED ORAL ONCE
Refills: 0 | Status: COMPLETED | OUTPATIENT
Start: 2022-03-07 | End: 2022-03-07

## 2022-03-07 RX ORDER — LOSARTAN POTASSIUM 100 MG/1
100 TABLET, FILM COATED ORAL DAILY
Refills: 0 | Status: DISCONTINUED | OUTPATIENT
Start: 2022-03-07 | End: 2022-03-08

## 2022-03-07 RX ORDER — METOPROLOL TARTRATE 50 MG
1 TABLET ORAL
Qty: 0 | Refills: 0 | DISCHARGE

## 2022-03-07 RX ORDER — ASPIRIN/CALCIUM CARB/MAGNESIUM 324 MG
1 TABLET ORAL
Qty: 0 | Refills: 0 | DISCHARGE

## 2022-03-07 RX ORDER — SODIUM CHLORIDE 9 MG/ML
1000 INJECTION INTRAMUSCULAR; INTRAVENOUS; SUBCUTANEOUS
Refills: 0 | Status: DISCONTINUED | OUTPATIENT
Start: 2022-03-07 | End: 2022-03-08

## 2022-03-07 RX ORDER — ATORVASTATIN CALCIUM 80 MG/1
40 TABLET, FILM COATED ORAL AT BEDTIME
Refills: 0 | Status: DISCONTINUED | OUTPATIENT
Start: 2022-03-07 | End: 2022-03-08

## 2022-03-07 RX ORDER — EMPAGLIFLOZIN 10 MG/1
1 TABLET, FILM COATED ORAL
Qty: 0 | Refills: 0 | DISCHARGE

## 2022-03-07 RX ORDER — DEXTROSE 50 % IN WATER 50 %
12.5 SYRINGE (ML) INTRAVENOUS ONCE
Refills: 0 | Status: DISCONTINUED | OUTPATIENT
Start: 2022-03-07 | End: 2022-03-08

## 2022-03-07 RX ORDER — CLOPIDOGREL BISULFATE 75 MG/1
1 TABLET, FILM COATED ORAL
Qty: 30 | Refills: 11
Start: 2022-03-07 | End: 2023-03-01

## 2022-03-07 RX ORDER — METFORMIN HYDROCHLORIDE 850 MG/1
1 TABLET ORAL
Qty: 0 | Refills: 0 | DISCHARGE

## 2022-03-07 RX ORDER — ATORVASTATIN CALCIUM 80 MG/1
1 TABLET, FILM COATED ORAL
Qty: 0 | Refills: 0 | DISCHARGE

## 2022-03-07 RX ORDER — SODIUM CHLORIDE 9 MG/ML
1000 INJECTION INTRAMUSCULAR; INTRAVENOUS; SUBCUTANEOUS
Refills: 0 | Status: DISCONTINUED | OUTPATIENT
Start: 2022-03-07 | End: 2022-03-07

## 2022-03-07 RX ORDER — ASPIRIN/CALCIUM CARB/MAGNESIUM 324 MG
81 TABLET ORAL DAILY
Refills: 0 | Status: DISCONTINUED | OUTPATIENT
Start: 2022-03-08 | End: 2022-03-08

## 2022-03-07 RX ORDER — DIPHENHYDRAMINE HCL 50 MG
50 CAPSULE ORAL ONCE
Refills: 0 | Status: COMPLETED | OUTPATIENT
Start: 2022-03-07 | End: 2022-03-07

## 2022-03-07 RX ORDER — ATORVASTATIN CALCIUM 80 MG/1
40 TABLET, FILM COATED ORAL ONCE
Refills: 0 | Status: DISCONTINUED | OUTPATIENT
Start: 2022-03-07 | End: 2022-03-07

## 2022-03-07 RX ORDER — LOSARTAN POTASSIUM 100 MG/1
1 TABLET, FILM COATED ORAL
Qty: 0 | Refills: 0 | DISCHARGE

## 2022-03-07 RX ORDER — GLUCAGON INJECTION, SOLUTION 0.5 MG/.1ML
1 INJECTION, SOLUTION SUBCUTANEOUS ONCE
Refills: 0 | Status: DISCONTINUED | OUTPATIENT
Start: 2022-03-07 | End: 2022-03-08

## 2022-03-07 RX ORDER — ISOSORBIDE MONONITRATE 60 MG/1
1 TABLET, EXTENDED RELEASE ORAL
Qty: 0 | Refills: 0 | DISCHARGE

## 2022-03-07 RX ORDER — DEXTROSE 50 % IN WATER 50 %
15 SYRINGE (ML) INTRAVENOUS ONCE
Refills: 0 | Status: DISCONTINUED | OUTPATIENT
Start: 2022-03-07 | End: 2022-03-08

## 2022-03-07 RX ORDER — ASPIRIN/CALCIUM CARB/MAGNESIUM 324 MG
1 TABLET ORAL
Qty: 30 | Refills: 11
Start: 2022-03-07 | End: 2023-03-01

## 2022-03-07 RX ORDER — ACETAMINOPHEN 500 MG
650 TABLET ORAL ONCE
Refills: 0 | Status: COMPLETED | OUTPATIENT
Start: 2022-03-07 | End: 2022-03-07

## 2022-03-07 RX ORDER — CHLORHEXIDINE GLUCONATE 213 G/1000ML
1 SOLUTION TOPICAL ONCE
Refills: 0 | Status: DISCONTINUED | OUTPATIENT
Start: 2022-03-07 | End: 2022-03-07

## 2022-03-07 RX ORDER — CLOPIDOGREL BISULFATE 75 MG/1
75 TABLET, FILM COATED ORAL DAILY
Refills: 0 | Status: DISCONTINUED | OUTPATIENT
Start: 2022-03-08 | End: 2022-03-08

## 2022-03-07 RX ORDER — DEXTROSE 50 % IN WATER 50 %
25 SYRINGE (ML) INTRAVENOUS ONCE
Refills: 0 | Status: DISCONTINUED | OUTPATIENT
Start: 2022-03-07 | End: 2022-03-08

## 2022-03-07 RX ORDER — NITROGLYCERIN 6.5 MG
1 CAPSULE, EXTENDED RELEASE ORAL
Qty: 0 | Refills: 0 | DISCHARGE

## 2022-03-07 RX ORDER — METOPROLOL TARTRATE 50 MG
50 TABLET ORAL DAILY
Refills: 0 | Status: DISCONTINUED | OUTPATIENT
Start: 2022-03-07 | End: 2022-03-08

## 2022-03-07 RX ORDER — ASPIRIN/CALCIUM CARB/MAGNESIUM 324 MG
81 TABLET ORAL ONCE
Refills: 0 | Status: COMPLETED | OUTPATIENT
Start: 2022-03-07 | End: 2022-03-07

## 2022-03-07 RX ADMIN — Medication 650 MILLIGRAM(S): at 15:47

## 2022-03-07 RX ADMIN — CLOPIDOGREL BISULFATE 75 MILLIGRAM(S): 75 TABLET, FILM COATED ORAL at 09:22

## 2022-03-07 RX ADMIN — Medication 81 MILLIGRAM(S): at 09:22

## 2022-03-07 RX ADMIN — ATORVASTATIN CALCIUM 40 MILLIGRAM(S): 80 TABLET, FILM COATED ORAL at 21:51

## 2022-03-07 RX ADMIN — Medication 50 MILLIGRAM(S): at 11:20

## 2022-03-07 RX ADMIN — Medication 650 MILLIGRAM(S): at 16:47

## 2022-03-07 RX ADMIN — Medication 4: at 17:18

## 2022-03-07 RX ADMIN — SODIUM CHLORIDE 500 MILLILITER(S): 9 INJECTION INTRAMUSCULAR; INTRAVENOUS; SUBCUTANEOUS at 09:22

## 2022-03-07 RX ADMIN — Medication 50 MILLIGRAM(S): at 15:47

## 2022-03-07 NOTE — DISCHARGE NOTE PROVIDER - NSDCMRMEDTOKEN_GEN_ALL_CORE_FT
aspirin 81 mg oral delayed release tablet: 1 tab(s) orally once a day  Jardiance 25 mg oral tablet: 1 tab(s) orally once a day (in the morning)  losartan 100 mg oral tablet: 1 tab(s) orally once a day  Plavix 75 mg oral tablet: 1 tab(s) orally once a day  rosuvastatin 20 mg oral capsule: 1 cap(s) orally once a day (at bedtime)   Toprol-XL 50 mg oral tablet, extended release: 1 tab(s) orally once a day  Vascepa 1 g oral capsule: 2 cap(s) orally 2 times a day   aspirin 81 mg oral delayed release tablet: 1 tab(s) orally once a day  cardiac rehabilitation: Cardiac rehabilitation 3 times weekly x 3 months  Jardiance 25 mg oral tablet: 1 tab(s) orally once a day (in the morning)  losartan 100 mg oral tablet: 1 tab(s) orally once a day  Plavix 75 mg oral tablet: 1 tab(s) orally once a day  rosuvastatin 20 mg oral capsule: 1 cap(s) orally once a day (at bedtime)   Toprol-XL 50 mg oral tablet, extended release: 1 tab(s) orally once a day  Vascepa 1 g oral capsule: 2 cap(s) orally 2 times a day

## 2022-03-07 NOTE — DISCHARGE NOTE PROVIDER - NSDCFUADDINST_GEN_ALL_CORE_FT
You do not need to keep your access site (wrist, groin, or both) covered any more.  No lifting >5 pounds for 5 days.  No baths/swimming/soaking your access site for 5 days.  You may shower and wash your hand/wrist with soap and water gently, but do not scrub aggressively.  For any concerns please contact [phone], or go to your nearest Emergency Department.

## 2022-03-07 NOTE — DISCHARGE NOTE PROVIDER - NSDCFUADDAPPT_GEN_ALL_CORE_FT
Our [facility] Cardiovascular Prevention Team from [facility] will contact you to arrange and outpatient office visit (telehealth or in person). The goal of this service is to optimize medication (blood pressure, cholesterol, diabetes) and lifestyle (diet, excersize, weight management, smoking) regimens to help lower the risk of cardiovascular events. They will work with your cardiologist to help support and guide you after your hospitalization.    We have provided you with a prescription for cardiac rehab which is medically supervised exercise program for your heart and has been shown to improve the quantity and quality of life of people with heart disease like yours. You should attend cardiac rehab 3 times per week for 12 weeks. We have provided you with a list of nearby facilities. Please call your insurance carrier to determine which of these facilities are covered under your plan. Please bring this prescription with you to your follow up appointment with your cardiologist who can then further assist you to enroll into a cardiac rehab program.

## 2022-03-07 NOTE — PATIENT PROFILE ADULT - FALL HARM RISK - HARM RISK INTERVENTIONS

## 2022-03-07 NOTE — DISCHARGE NOTE PROVIDER - CARE PROVIDER_API CALL
Katelyn Tejeda)  Cardiovascular Medicine  [facility], [facility], [address], [address]  [place] [address]  Phone: [phone]  Fax: [phone]  Established Patient  Follow Up Time: 1-3 days

## 2022-03-07 NOTE — DISCHARGE NOTE PROVIDER - NSDCCPCAREPLAN_GEN_ALL_CORE_FT
PRINCIPAL DISCHARGE DIAGNOSIS  Diagnosis: CAD (coronary artery disease)  Assessment and Plan of Treatment: You have a diagnosis of coronary artery disease and received a stent to your coronary artery.  You have been started on Aspirin 81mg daily and Plavix (Clopidogrel) 75mg daily. You MUST continue taking the daily Aspirin and Plavix to ensure a clot does not form in your stent. DO NOT STOP THESE MEDICATIONS FOR ANY REASON UNLESS OTHERWISE INDICATED BY YOUR CARDIOLOGIST BECAUSE THIS WILL PUT YOU AT RISK FOR A HEART ATTACK. You should refrain from strenuous activity and heavy lifting for 5 days. Please make a follow up appointment with your cardiologist within 1-2 weeks of your discharge. All of your prescriptions have been sent electronically to your pharmacy.  Our [facility] Cardiovascular Prevention Team from [facility] will contact you to arrange and outpatient office visit (telehealth or in person). The goal of this service is to optimize medication (blood pressure, cholesterol, diabetes) and lifestyle (diet, excersize, weight management, smoking) regimens to help lower the risk of cardiovascular events. They will work with your cardiologist to help support and guide you after your hospitalization.  We have provided you with a prescription for cardiac rehab which is medically supervised exercise program for your heart and has been shown to improve the quantity and quality of life of people with heart disease like yours. You should attend cardiac rehab 3 times per week for 12 weeks. We have provided you with a list of nearby facilities. Please call your insurance carrier to determine which of these facilities are covered under your plan. Please bring this prescription with you to your follow up appointment with your cardiologist who can then further assist you to enroll into a cardiac rehab program.      SECONDARY DISCHARGE DIAGNOSES  Diagnosis: HLD (hyperlipidemia)  Assessment and Plan of Treatment: Your home medication Rosuvastatin dose was increased to 20mg daily at bedtime. This was sent to your pharmacy, please pick it up and begin taking as prescribed.     PRINCIPAL DISCHARGE DIAGNOSIS  Diagnosis: CAD (coronary artery disease)  Assessment and Plan of Treatment: You have a diagnosis of coronary artery disease and received a stent to your left circumflex coronary artery.  You have been started on Aspirin 81mg daily and Plavix (Clopidogrel) 75mg daily. You MUST continue taking the daily Aspirin and Plavix to ensure a clot does not form in your stent. DO NOT STOP THESE MEDICATIONS FOR ANY REASON UNLESS OTHERWISE INDICATED BY YOUR CARDIOLOGIST BECAUSE THIS WILL PUT YOU AT RISK FOR A HEART ATTACK. You should refrain from strenuous activity and heavy lifting for 5 days. Please make a follow up appointment with your cardiologist Dr Tejeda within 1-2 weeks of your discharge. All of your prescriptions have been sent electronically to your pharmacy.  Our [facility] Cardiovascular Prevention Team from [facility] will contact you to arrange and outpatient office visit (telehealth or in person). The goal of this service is to optimize medication (blood pressure, cholesterol, diabetes) and lifestyle (diet, excersize, weight management, smoking) regimens to help lower the risk of cardiovascular events. They will work with your cardiologist to help support and guide you after your hospitalization.        SECONDARY DISCHARGE DIAGNOSES  Diagnosis: HLD (hyperlipidemia)  Assessment and Plan of Treatment: Your home medication Rosuvastatin dose was increased to 20mg daily at bedtime. This was sent to your pharmacy, please pick it up and begin taking as prescribed. Please also continue with Vascepa 2g twice daily.    Diagnosis: HTN (hypertension)  Assessment and Plan of Treatment: Please continue your medications as listed to keep your blood pressure controlled. For blood pressure that is too high or too low please see your doctor or go to the emergency room as necessary.

## 2022-03-07 NOTE — CHART NOTE - NSCHARTNOTEFT_GEN_A_CORE
WAQAS Ma called to bedside. Patient's groin oozing. Hemostasis achieved after 10 minutes of manual pressure and 0.5 mL epi/lido.

## 2022-03-07 NOTE — DISCHARGE NOTE PROVIDER - HOSPITAL COURSE
*Incomplete* - Med req done and meds sent, just needs cardiac rehab rx    65 yo M, Cantonese speaking, current everyday smoker (1PPD), SHELLFISH ALLERGY, PMHx HTN, HLD, DM2, CAD (w/ recent PCI see below) who initially presented to Cardiologist Dr. Tejeda c/o non-radiating SSCP with associated WONG described as mild intensity upon ambulation 1/2 city block. EST (9/10/21): severe, large sized reversible defect in true apex; as well as anteroseptal and anterior walls from apex to mid ventricle, consistent with inducible ischemia in LAD territory. Pt subsequently underwent diagnostic cardiac cath 10/4/21: unsuccessful intervention to pLAD ; subsquently patient returned for staged PCI. s/p cardiac cath 11/29/21: atherectomy/shockwave/NICOLE x 2 to pLAD and NICOLE x 1 to mLAD; LM normal, prox/mid LCx 70-80%, RCA luminal irregularities. Patient planned for staged PCI of Lcx in 4-6 weeks. Since procedure patient has been feeling better, with improved exertional chest pain but still significant exertional dyspnea but denies syncope, dizziness. Patient endorses compliance with DAPT  TTE 8/5/21: LVEF 55-60%, G1DD, (pseudonormal), no significant valvular disease. In light of pt's risk factors, residual CCS Anginal Class II-III Sx, known physiologically significant CAD, pt referred for staged PCI of p/mLCx 80-80% lesion.    ASA Class III, Mallampati Class III  NS  cc bolus x1 per hydration protocol. Next in the room.   Took DAPT on 3/6/22 and endorses daily compliance. Will provide Ecotrin 81 mg PO x1 & Plavix 75 mg PO x1 pre-cath.   Noted crab allergy. Premedication was sent last cath. Prednisone 50 mg PO q6hr x 3 doses sent (last dose 3/7/22 @ 6am). Endorses compliance. Will provide Benadryl 50 mg IV x1 on call to the cath lab.    Cardiac cath 3/7/22: IVUS guided shockwave/NICOLE x1 ostial/pLCx, LM 30% diffuse, ostialRamus 50%, p/mLAD stent patent,  -Access: R CFA s/p Perclose; no hematoma/bleed.    Post cath IV NS @250cc/hr x4hrs ordered per Dr. Tejeda    Per d/w Dr. Tejeda, pt's home Crestor dose was increased to 20mg QHS, which was sent to pharmacy and appropriate med instructions provided.    Pt is asymptomatic at this time and denies chest pain, SOB, WONG, palpitations, dizziness, LOC, N/V, diaphoresis, orthopnea/PND, and leg swelling. Pt able to ambulate and void without complication. VSS. Labs and telemetry reviewed. R groin access site stable and dressing C/D/I.  Pt is a candidate for discharge per  . Pt given appropriate discharge instructions, pt states they have an appropriate amount of their previous home meds unchanged from this visit at home, and any new medications were sent to their pharmacy. Pt instructed to f/u with Dr. Tejeda in 3 days.           Cardiac Rehab Indications (STEMI/NSTEMI/ACS/Unstable Angina/CHF/Chronic Stable Angina/Heart Surgery (CABG,Valve)/Post PCI):            *Education on benefits of Cardiac Rehab provided to patient: Yes         *Referral and Prescription Given for Cardiac Rehab: Yes         *Pt given list of locations & instructed to contact their insurance company to review list of participating providers. Yes         *Pt instructed to bring Cardiac Rehab prescription with them to Cardiology Follow up appointment for assistance with enrollment: Yes    Statin Prescribed (STEMI/NSTEMI/ACS/UA &/OR Post PCI this admission):  Yes - Home Crestor increased to 20mg QHS  DAPT: Prescriptions for Aspirin/Plavix/Brilinta/Effient e-prescribed to patient's pharmacy: Yes 65 yo M, Cantonese speaking, current everyday smoker (1PPD), SHELLFISH ALLERGY, PMHx HTN, HLD, DM2, CAD (w/ recent PCI see below) who initially presented to Cardiologist Dr. Tejeda c/o non-radiating SSCP with associated WONG described as mild intensity upon ambulation 1/2 city block. EST (9/10/21): severe, large sized reversible defect in true apex; as well as anteroseptal and anterior walls from apex to mid ventricle, consistent with inducible ischemia in LAD territory. Pt subsequently underwent diagnostic cardiac cath 10/4/21: unsuccessful intervention to pLAD ; subsquently patient returned for staged PCI. s/p cardiac cath 11/29/21: atherectomy/shockwave/NICOLE x 2 to pLAD and NICOLE x 1 to mLAD; LM normal, prox/mid LCx 70-80%, RCA luminal irregularities. Patient planned for staged PCI of Lcx in 4-6 weeks. Since procedure patient has been feeling better, with improved exertional chest pain but still significant exertional dyspnea but denies syncope, dizziness. Patient endorses compliance with DAPT  TTE 8/5/21: LVEF 55-60%, G1DD, (pseudonormal), no significant valvular disease. In light of pt's risk factors, residual CCS Anginal Class II-III Sx, known physiologically significant CAD, pt referred for staged PCI of p/mLCx 80-80% lesion.    Pt now s/p Cardiac cath 3/7/22: IVUS guided shockwave/NICOLE x1 ostial/pLCx, LM 30% diffuse, ostialRamus 50%, p/mLAD stent patent,  -Access: R CFA s/p Perclose; no hematoma/bleed.    Per d/w Dr. Tejeda, pt's home Crestor dose was increased to 20mg QHS, which was sent to pharmacy and appropriate med instructions provided.    Pt is asymptomatic at this time and denies chest pain, SOB, WONG, palpitations, dizziness, LOC, N/V, diaphoresis, orthopnea/PND, and leg swelling. Pt able to ambulate and void without complication. VSS. Labs and telemetry reviewed. R groin access site stable and dressing C/D/I.  Pt is a candidate for discharge per Dr. Jose. Pt given appropriate discharge instructions, pt states they have an appropriate amount of their previous home meds unchanged from this visit at home, and any new medications were sent to their pharmacy. Pt instructed to f/u with Dr. Tejeda in 3 days.           No cardiac rehab as patient was provided with script after last PCI.     Statin Prescribed (STEMI/NSTEMI/ACS/UA &/OR Post PCI this admission):  Yes - Home Crestor increased to 20mg QHS  DAPT: Prescriptions for Aspirin/Plavix/Brilinta/Effient e-prescribed to patient's pharmacy: Yes

## 2022-03-08 ENCOUNTER — TRANSCRIPTION ENCOUNTER (OUTPATIENT)
Age: 67
End: 2022-03-08

## 2022-03-08 VITALS — TEMPERATURE: 98 F

## 2022-03-08 LAB
ANION GAP SERPL CALC-SCNC: 11 MMOL/L — SIGNIFICANT CHANGE UP (ref 5–17)
BUN SERPL-MCNC: 22 MG/DL — SIGNIFICANT CHANGE UP (ref 7–23)
CALCIUM SERPL-MCNC: 8.9 MG/DL — SIGNIFICANT CHANGE UP (ref 8.4–10.5)
CHLORIDE SERPL-SCNC: 106 MMOL/L — SIGNIFICANT CHANGE UP (ref 96–108)
CO2 SERPL-SCNC: 24 MMOL/L — SIGNIFICANT CHANGE UP (ref 22–31)
CREAT SERPL-MCNC: 0.69 MG/DL — SIGNIFICANT CHANGE UP (ref 0.5–1.3)
EGFR: 102 ML/MIN/1.73M2 — SIGNIFICANT CHANGE UP
GLUCOSE BLDC GLUCOMTR-MCNC: 176 MG/DL — HIGH (ref 70–99)
GLUCOSE BLDC GLUCOMTR-MCNC: 198 MG/DL — HIGH (ref 70–99)
GLUCOSE SERPL-MCNC: 163 MG/DL — HIGH (ref 70–99)
HCT VFR BLD CALC: 43.8 % — SIGNIFICANT CHANGE UP (ref 39–50)
HGB BLD-MCNC: 14.1 G/DL — SIGNIFICANT CHANGE UP (ref 13–17)
MAGNESIUM SERPL-MCNC: 2.1 MG/DL — SIGNIFICANT CHANGE UP (ref 1.6–2.6)
MCHC RBC-ENTMCNC: 30 PG — SIGNIFICANT CHANGE UP (ref 27–34)
MCHC RBC-ENTMCNC: 32.2 GM/DL — SIGNIFICANT CHANGE UP (ref 32–36)
MCV RBC AUTO: 93.2 FL — SIGNIFICANT CHANGE UP (ref 80–100)
NRBC # BLD: 0 /100 WBCS — SIGNIFICANT CHANGE UP (ref 0–0)
PLATELET # BLD AUTO: 155 K/UL — SIGNIFICANT CHANGE UP (ref 150–400)
POTASSIUM SERPL-MCNC: 3.8 MMOL/L — SIGNIFICANT CHANGE UP (ref 3.5–5.3)
POTASSIUM SERPL-SCNC: 3.8 MMOL/L — SIGNIFICANT CHANGE UP (ref 3.5–5.3)
RBC # BLD: 4.7 M/UL — SIGNIFICANT CHANGE UP (ref 4.2–5.8)
RBC # FLD: 13.8 % — SIGNIFICANT CHANGE UP (ref 10.3–14.5)
SODIUM SERPL-SCNC: 141 MMOL/L — SIGNIFICANT CHANGE UP (ref 135–145)
WBC # BLD: 8.41 K/UL — SIGNIFICANT CHANGE UP (ref 3.8–10.5)
WBC # FLD AUTO: 8.41 K/UL — SIGNIFICANT CHANGE UP (ref 3.8–10.5)

## 2022-03-08 PROCEDURE — 82962 GLUCOSE BLOOD TEST: CPT

## 2022-03-08 PROCEDURE — 80061 LIPID PANEL: CPT

## 2022-03-08 PROCEDURE — 82553 CREATINE MB FRACTION: CPT

## 2022-03-08 PROCEDURE — 85025 COMPLETE CBC W/AUTO DIFF WBC: CPT

## 2022-03-08 PROCEDURE — C1894: CPT

## 2022-03-08 PROCEDURE — 85027 COMPLETE CBC AUTOMATED: CPT

## 2022-03-08 PROCEDURE — 80053 COMPREHEN METABOLIC PANEL: CPT

## 2022-03-08 PROCEDURE — C1874: CPT

## 2022-03-08 PROCEDURE — C1887: CPT

## 2022-03-08 PROCEDURE — 82550 ASSAY OF CK (CPK): CPT

## 2022-03-08 PROCEDURE — 93005 ELECTROCARDIOGRAM TRACING: CPT

## 2022-03-08 PROCEDURE — 85730 THROMBOPLASTIN TIME PARTIAL: CPT

## 2022-03-08 PROCEDURE — C1725: CPT

## 2022-03-08 PROCEDURE — 36415 COLL VENOUS BLD VENIPUNCTURE: CPT

## 2022-03-08 PROCEDURE — C1753: CPT

## 2022-03-08 PROCEDURE — C1760: CPT

## 2022-03-08 PROCEDURE — 99239 HOSP IP/OBS DSCHRG MGMT >30: CPT

## 2022-03-08 PROCEDURE — C1769: CPT

## 2022-03-08 PROCEDURE — 85610 PROTHROMBIN TIME: CPT

## 2022-03-08 PROCEDURE — 83036 HEMOGLOBIN GLYCOSYLATED A1C: CPT

## 2022-03-08 PROCEDURE — 83735 ASSAY OF MAGNESIUM: CPT

## 2022-03-08 PROCEDURE — 80048 BASIC METABOLIC PNL TOTAL CA: CPT

## 2022-03-08 RX ORDER — POTASSIUM CHLORIDE 20 MEQ
20 PACKET (EA) ORAL ONCE
Refills: 0 | Status: COMPLETED | OUTPATIENT
Start: 2022-03-08 | End: 2022-03-08

## 2022-03-08 RX ORDER — INSULIN LISPRO 100/ML
VIAL (ML) SUBCUTANEOUS
Refills: 0 | Status: DISCONTINUED | OUTPATIENT
Start: 2022-03-08 | End: 2022-03-08

## 2022-03-08 RX ADMIN — Medication 81 MILLIGRAM(S): at 12:04

## 2022-03-08 RX ADMIN — Medication 50 MILLIGRAM(S): at 07:26

## 2022-03-08 RX ADMIN — CLOPIDOGREL BISULFATE 75 MILLIGRAM(S): 75 TABLET, FILM COATED ORAL at 12:04

## 2022-03-08 RX ADMIN — Medication 20 MILLIEQUIVALENT(S): at 08:20

## 2022-03-08 RX ADMIN — Medication 1: at 12:04

## 2022-03-08 RX ADMIN — Medication 2: at 07:52

## 2022-03-08 RX ADMIN — LOSARTAN POTASSIUM 100 MILLIGRAM(S): 100 TABLET, FILM COATED ORAL at 07:27

## 2022-03-08 NOTE — DISCHARGE NOTE NURSING/CASE MANAGEMENT/SOCIAL WORK - NSDCPEFALRISK_GEN_ALL_CORE
For information on Fall & Injury Prevention, visit: [email or web address] OR  [email or web address] OR  https://www.cdc.gov/steadi/patient.html

## 2022-03-08 NOTE — DISCHARGE NOTE NURSING/CASE MANAGEMENT/SOCIAL WORK - NSDCFUADDAPPT_GEN_ALL_CORE_FT
Our [facility] Cardiovascular Prevention Team from [facility] will contact you to arrange and outpatient office visit (telehealth or in person). The goal of this service is to optimize medication (blood pressure, cholesterol, diabetes) and lifestyle (diet, excersize, weight management, smoking) regimens to help lower the risk of cardiovascular events. They will work with your cardiologist to help support and guide you after your hospitalization.    We have provided you with a prescription for cardiac rehab which is medically supervised exercise program for your heart and has been shown to improve the quantity and quality of life of people with heart disease like yours. You should attend cardiac rehab 3 times per week for 12 weeks. We have provided you with a list of nearby facilities. Please call your insurance carrier to determine which of these facilities are covered under your plan. Please bring this prescription with you to your follow up appointment with your cardiologist who can then further assist you to enroll into a cardiac rehab program.

## 2022-03-08 NOTE — DISCHARGE NOTE NURSING/CASE MANAGEMENT/SOCIAL WORK - PATIENT PORTAL LINK FT
You can access the FollowMyHealth Patient Portal offered by [facility] by registering at the following website: [email or web address]. By joining [employer]’s FollowMyHealth portal, you will also be able to view your health information using other applications (apps) compatible with our system.

## 2022-03-15 DIAGNOSIS — I25.84 CORONARY ATHEROSCLEROSIS DUE TO CALCIFIED CORONARY LESION: ICD-10-CM

## 2022-03-15 DIAGNOSIS — F17.210 NICOTINE DEPENDENCE, CIGARETTES, UNCOMPLICATED: ICD-10-CM

## 2022-03-15 DIAGNOSIS — Z79.52 LONG TERM (CURRENT) USE OF SYSTEMIC STEROIDS: ICD-10-CM

## 2022-03-15 DIAGNOSIS — E78.5 HYPERLIPIDEMIA, UNSPECIFIED: ICD-10-CM

## 2022-03-15 DIAGNOSIS — Z79.82 LONG TERM (CURRENT) USE OF ASPIRIN: ICD-10-CM

## 2022-03-15 DIAGNOSIS — Z91.013 ALLERGY TO SEAFOOD: ICD-10-CM

## 2022-03-15 DIAGNOSIS — Z95.5 PRESENCE OF CORONARY ANGIOPLASTY IMPLANT AND GRAFT: ICD-10-CM

## 2022-03-15 DIAGNOSIS — E11.9 TYPE 2 DIABETES MELLITUS WITHOUT COMPLICATIONS: ICD-10-CM

## 2022-03-15 DIAGNOSIS — Z79.84 LONG TERM (CURRENT) USE OF ORAL HYPOGLYCEMIC DRUGS: ICD-10-CM

## 2022-03-15 DIAGNOSIS — I25.10 ATHEROSCLEROTIC HEART DISEASE OF NATIVE CORONARY ARTERY WITHOUT ANGINA PECTORIS: ICD-10-CM

## 2022-03-15 DIAGNOSIS — I10 ESSENTIAL (PRIMARY) HYPERTENSION: ICD-10-CM

## 2025-06-05 NOTE — PATIENT PROFILE ADULT - FUNCTIONAL ASSESSMENT - BASIC MOBILITY 3.
Indication: elevated calprotectin    Allergies: NKDA    Medical history: ITP now resolved, normal platelet count, never had signs of bleeding    Surgical history: None    Current medications:    Physical Exam:  General: within normal limits  HEENT: within normal limits  Respiratory: within normal limits  CV: within normal limits  Abdomen: within normal limits  MSK: within normal limits  Skin: within normal limits  Neuro: within normal limits 4 = No assist / stand by assistance